# Patient Record
Sex: FEMALE | Race: BLACK OR AFRICAN AMERICAN | Employment: STUDENT | URBAN - METROPOLITAN AREA
[De-identification: names, ages, dates, MRNs, and addresses within clinical notes are randomized per-mention and may not be internally consistent; named-entity substitution may affect disease eponyms.]

---

## 2021-04-13 LAB — DIABETIC RETINOPATHY: POSITIVE

## 2021-05-05 LAB — DIABETIC RETINOPATHY: POSITIVE

## 2021-06-02 DIAGNOSIS — Z79.4 LONG-TERM INSULIN USE (HCC): ICD-10-CM

## 2021-06-02 DIAGNOSIS — E04.1 THYROID NODULE: ICD-10-CM

## 2021-06-02 PROBLEM — E78.5 HYPERLIPIDEMIA, MILD: Status: ACTIVE | Noted: 2020-01-07

## 2021-06-02 PROBLEM — I10 ESSENTIAL HYPERTENSION: Status: ACTIVE | Noted: 2020-06-02

## 2021-06-02 PROBLEM — E66.9 OBESITY (BMI 30-39.9): Status: ACTIVE | Noted: 2019-07-01

## 2021-06-02 PROBLEM — E10.8: Status: ACTIVE | Noted: 2020-01-07

## 2021-06-02 RX ORDER — CHLORAL HYDRATE 500 MG
CAPSULE ORAL
COMMUNITY
End: 2021-06-03

## 2021-06-02 RX ORDER — ERYTHROMYCIN 5 MG/G
0.5 OINTMENT OPHTHALMIC NIGHTLY
COMMUNITY
Start: 2020-10-06 | End: 2021-06-03

## 2021-06-02 RX ORDER — HYDROCHLOROTHIAZIDE 25 MG/1
25 TABLET ORAL EVERY MORNING
COMMUNITY
Start: 2021-05-27 | End: 2021-06-03

## 2021-06-03 ENCOUNTER — OFFICE VISIT (OUTPATIENT)
Dept: ENDOCRINOLOGY | Age: 56
End: 2021-06-03
Payer: COMMERCIAL

## 2021-06-03 VITALS
BODY MASS INDEX: 40.23 KG/M2 | SYSTOLIC BLOOD PRESSURE: 140 MMHG | OXYGEN SATURATION: 98 % | HEIGHT: 62 IN | DIASTOLIC BLOOD PRESSURE: 68 MMHG | WEIGHT: 218.6 LBS | HEART RATE: 104 BPM

## 2021-06-03 DIAGNOSIS — E10.69 DYSLIPIDEMIA DUE TO TYPE 1 DIABETES MELLITUS (HCC): ICD-10-CM

## 2021-06-03 DIAGNOSIS — Z46.81 INSULIN PUMP TITRATION: ICD-10-CM

## 2021-06-03 DIAGNOSIS — E78.5 DYSLIPIDEMIA DUE TO TYPE 1 DIABETES MELLITUS (HCC): ICD-10-CM

## 2021-06-03 DIAGNOSIS — E55.9 VITAMIN D DEFICIENCY: ICD-10-CM

## 2021-06-03 DIAGNOSIS — I10 ESSENTIAL HYPERTENSION: ICD-10-CM

## 2021-06-03 DIAGNOSIS — E10.3299 BACKGROUND DIABETIC RETINOPATHY WITHOUT MACULAR EDEMA ASSOCIATED WITH TYPE 1 DIABETES MELLITUS (HCC): Primary | ICD-10-CM

## 2021-06-03 DIAGNOSIS — E10.21 TYPE 1 DIABETES MELLITUS WITH DIABETIC NEPHROPATHY (HCC): ICD-10-CM

## 2021-06-03 DIAGNOSIS — E04.2 MULTIPLE THYROID NODULES: ICD-10-CM

## 2021-06-03 DIAGNOSIS — Z96.41 INSULIN PUMP STATUS: ICD-10-CM

## 2021-06-03 PROCEDURE — 99205 OFFICE O/P NEW HI 60 MIN: CPT | Performed by: INTERNAL MEDICINE

## 2021-06-03 RX ORDER — BLOOD-GLUCOSE,RECEIVER,CONT
EACH MISCELLANEOUS
Qty: 1 DEVICE | Refills: 0 | Status: SHIPPED | OUTPATIENT
Start: 2021-06-03 | End: 2021-07-06

## 2021-06-03 RX ORDER — LOSARTAN POTASSIUM AND HYDROCHLOROTHIAZIDE 12.5; 5 MG/1; MG/1
1 TABLET ORAL DAILY
Qty: 90 TABLET | Refills: 1 | Status: SHIPPED | OUTPATIENT
Start: 2021-06-03 | End: 2021-11-29

## 2021-06-03 RX ORDER — BLOOD-GLUCOSE TRANSMITTER
EACH MISCELLANEOUS
Qty: 1 EACH | Refills: 3 | Status: SHIPPED | OUTPATIENT
Start: 2021-06-03 | End: 2021-07-06

## 2021-06-03 RX ORDER — BLOOD-GLUCOSE SENSOR
EACH MISCELLANEOUS
Qty: 3 EACH | Refills: 5 | Status: SHIPPED | OUTPATIENT
Start: 2021-06-03 | End: 2021-07-06

## 2021-06-03 NOTE — PROGRESS NOTES
Patient ID:   Christal Sagastume is a 50 y.o. female    Chief Complaint:   Christal Sagastume presents for an initial evaluation of Type 1 Diabetes Mellitus , Hyperlipidemia and hypertension. Referred by Dr. Mary Sims MD.     Subjective:   Type 1 Diabetes Mellitus diagnosed in 8. On insulin since diagnosis. Previously seen by Dr Miles Chauhan. She was not happy with the care . Used Humalog for Omnipod     On asking if she counts carbs. She said she tries some times. She said she eats same stuff     Using meter to check sugars     Basal rates (units per hour)   MN: 1.35   4am: 1.15  6:30am: 1.45  10am: 1.35     Insulin to carb ratio   MN: 4  6pm: 5    Correction   MN: 50    Target glucose range:   Active insulin time : 4 hours     Max bolus: 20 units         Checks blood sugars 2-4 times per day. Reviewed/Reported  AM: 123-402  Lunch: 108-301  Supper:  55, 135-250  HS: 186-419    Hypoglycemias: 55 last week before dinner. She does not remember why it was low. Meals: Three, dinner is bigger. Usually no snacks. No sodas. Exercise: Started dance/stretch and strengthening class yesterday. It is twice a week. She will also start doing Howard-chi    Denies chest pain, exertional dyspnea. Family history of CAD: Dad had MI in his 63's   Denies smoking. Counselled for smoking cessation. Denies/Drinks alcohol. Currently on ASA mg daily     Thyroid nodules:   US May 2020   One on right and and one on left   One subcentimeter thyroid nodule in the right thyroid lobe which is smoothly marginated, not taller-than-wide, and with no echogenic foci, deemed to be clinically insignificant, and for which no biopsy or follow up is recommended per ACR TIRADS criteria.     Other potentially more significant thyroid nodule is are detailed below. No new or enlarging thyroid nodule is identified.      Thyroid Nodule Location: Upper left thyroid lobe   Thyroid Nodule Size:  1 cm   Composition: Solid or almost completely solid Frequency of Social Gatherings with Friends and Family:     Attends Oriental orthodox Services:     Active Member of Clubs or Organizations:     Attends Club or Organization Meetings:     Marital Status:    Intimate Partner Violence:     Fear of Current or Ex-Partner:     Emotionally Abused:     Physically Abused:     Sexually Abused:        Past Medical History:   Diagnosis Date    Diabetes mellitus ([facility] [address]     Edema     Hyperlipidemia     Insulin pump in place     Long-term insulin use ([address]     Low back pain     Thyroid nodule     Type 1 diabetes mellitus with manifestations ([facility] [address]        Past Surgical History:   Procedure Laterality Date    CARPAL TUNNEL RELEASE       SECTION      X3     SECTION      HAND SURGERY      CARPAL X 4 ON RIGHT AND CARPAL X3 ON LEFT         Allergies   Allergen Reactions    Ace Inhibitors Swelling    Other Anaphylaxis    Penicillins      THROAT CLOSES    Atorvastatin      Other reaction(s): Muscle Aches         Current Outpatient Medications:     Continuous Blood Gluc  (DEXCOM G6 ) SO, Use for personal CGM, Disp: 1 Device, Rfl: 0    Continuous Blood Gluc Sensor (DEXCOM G6 SENSOR) MISC, Change every 10 days. Use for personal CGMS. On insulin pump for diabetes. , Disp: 3 each, Rfl: 5    Continuous Blood Gluc Transmit (DEXCOM G6 TRANSMITTER) MISC, Change every 3 months, Disp: 1 each, Rfl: 3    losartan-hydroCHLOROthiazide (HYZAAR) 50-12.5 MG per tablet, Take 1 tablet by mouth daily, Disp: 90 tablet, Rfl: 1    insulin lispro (HUMALOG) 100 UNIT/ML injection, Inject  into the skin 3 times daily (before meals). INSULIN PUMP , Disp: , Rfl:       Review of Systems:    Constitutional: Negative for fever, chills, and unexpected weight change. HENT: Negative for congestion, ear pain, rhinorrhea,  sore throat and trouble swallowing. Eyes: Negative for photophobia, redness, itching.    Respiratory: Negative for cough, shortness of breath and this visit:    Background diabetic retinopathy without macular edema associated with type 1 diabetes mellitus ([address]  -     Continuous Blood Gluc  (Katt Smith) SO; Use for personal CGM  -     Continuous Blood Gluc Sensor (DEXCOM G6 SENSOR) MISC; Change every 10 days. Use for personal CGMS. On insulin pump for diabetes. -     Continuous Blood Gluc Transmit (DEXCOM G6 TRANSMITTER) MISC; Change every 3 months  -     losartan-hydroCHLOROthiazide (HYZAAR) 50-12.5 MG per tablet; Take 1 tablet by mouth daily  -     Vitamin D 25 Hydroxy; Future  -     Comprehensive Metabolic Panel; Future  -     Hemoglobin A1C; Future  -     Lipid, Fasting; Future  -     Microalbumin / Creatinine Urine Ratio; Future  -     US THYROID; Future    Type 1 diabetes mellitus with diabetic nephropathy (HCC)  -     Continuous Blood Gluc  (DEXCOM G6 ) SO; Use for personal CGM  -     Continuous Blood Gluc Sensor (DEXCOM G6 SENSOR) MISC; Change every 10 days. Use for personal CGMS. On insulin pump for diabetes. -     Continuous Blood Gluc Transmit (DEXCOM G6 TRANSMITTER) MISC; Change every 3 months  -     losartan-hydroCHLOROthiazide (HYZAAR) 50-12.5 MG per tablet; Take 1 tablet by mouth daily  -     Vitamin D 25 Hydroxy; Future  -     Comprehensive Metabolic Panel; Future  -     Hemoglobin A1C; Future  -     Lipid, Fasting; Future  -     Microalbumin / Creatinine Urine Ratio; Future  -     US THYROID; Future    Essential hypertension  -     Continuous Blood Gluc  (DEXCOM G6 ) SO; Use for personal CGM  -     Continuous Blood Gluc Sensor (DEXCOM G6 SENSOR) MISC; Change every 10 days. Use for personal CGMS. On insulin pump for diabetes. -     Continuous Blood Gluc Transmit (DEXCOM G6 TRANSMITTER) MISC; Change every 3 months  -     losartan-hydroCHLOROthiazide (HYZAAR) 50-12.5 MG per tablet; Take 1 tablet by mouth daily  -     Vitamin D 25 Hydroxy;  Future  -     Comprehensive Metabolic Panel; Future  -     Hemoglobin A1C; Future  -     Lipid, Fasting; Future  -     Microalbumin / Creatinine Urine Ratio; Future  -     US THYROID; Future    Dyslipidemia due to type 1 diabetes mellitus (HCC)  -     Continuous Blood Gluc  (DEXCOM G6 ) SO; Use for personal CGM  -     Continuous Blood Gluc Sensor (DEXCOM G6 SENSOR) MISC; Change every 10 days. Use for personal CGMS. On insulin pump for diabetes. -     Continuous Blood Gluc Transmit (DEXCOM G6 TRANSMITTER) MISC; Change every 3 months  -     losartan-hydroCHLOROthiazide (HYZAAR) 50-12.5 MG per tablet; Take 1 tablet by mouth daily  -     Vitamin D 25 Hydroxy; Future  -     Comprehensive Metabolic Panel; Future  -     Hemoglobin A1C; Future  -     Lipid, Fasting; Future  -     Microalbumin / Creatinine Urine Ratio; Future  -     US THYROID; Future    Vitamin D deficiency  -     Continuous Blood Gluc  ([address]) SO; Use for personal CGM  -     Continuous Blood Gluc Sensor (DEXCOM G6 SENSOR) MISC; Change every 10 days. Use for personal CGMS. On insulin pump for diabetes. -     Continuous Blood Gluc Transmit (DEXCOM G6 TRANSMITTER) MISC; Change every 3 months  -     losartan-hydroCHLOROthiazide (HYZAAR) 50-12.5 MG per tablet; Take 1 tablet by mouth daily  -     Vitamin D 25 Hydroxy; Future  -     Comprehensive Metabolic Panel; Future  -     Hemoglobin A1C; Future  -     Lipid, Fasting; Future  -     Microalbumin / Creatinine Urine Ratio; Future  -     US THYROID; Future    Insulin pump status  -     Continuous Blood Gluc  (DEXCOM G6 ) SO; Use for personal CGM  -     Continuous Blood Gluc Sensor (DEXCOM G6 SENSOR) MISC; Change every 10 days. Use for personal CGMS. On insulin pump for diabetes. -     Continuous Blood Gluc Transmit (DEXCOM G6 TRANSMITTER) MISC; Change every 3 months  -     losartan-hydroCHLOROthiazide (HYZAAR) 50-12.5 MG per tablet;  Take 1 tablet by mouth daily  -     Vitamin D 25 Hydroxy; Future  -     Comprehensive Metabolic Panel; Future  -     Hemoglobin A1C; Future  -     Lipid, Fasting; Future  -     Microalbumin / Creatinine Urine Ratio; Future  -     US THYROID; Future    Insulin pump titration  -     Continuous Blood Gluc  (DEXCOM G6 ) SO; Use for personal CGM  -     Continuous Blood Gluc Sensor (DEXCOM G6 SENSOR) MISC; Change every 10 days. Use for personal CGMS. On insulin pump for diabetes. -     Continuous Blood Gluc Transmit (DEXCOM G6 TRANSMITTER) MISC; Change every 3 months  -     losartan-hydroCHLOROthiazide (HYZAAR) 50-12.5 MG per tablet; Take 1 tablet by mouth daily  -     Vitamin D 25 Hydroxy; Future  -     Comprehensive Metabolic Panel; Future  -     Hemoglobin A1C; Future  -     Lipid, Fasting; Future  -     Microalbumin / Creatinine Urine Ratio; Future  -     US THYROID; Future    Multiple thyroid nodules  -     Continuous Blood Gluc  (DEXCOM G6 ) SO; Use for personal CGM  -     Continuous Blood Gluc Sensor (DEXCOM G6 SENSOR) MISC; Change every 10 days. Use for personal CGMS. On insulin pump for diabetes. -     Continuous Blood Gluc Transmit (DEXCOM G6 TRANSMITTER) MISC; Change every 3 months  -     losartan-hydroCHLOROthiazide (HYZAAR) 50-12.5 MG per tablet; Take 1 tablet by mouth daily  -     Vitamin D 25 Hydroxy; Future  -     Comprehensive Metabolic Panel; Future  -     Hemoglobin A1C; Future  -     Lipid, Fasting; Future  -     Microalbumin / Creatinine Urine Ratio; Future  -     US THYROID; Future          1: Type 1/2 DM complicated wit nephropathy, retinopathy    Controlled A1C 8.6% Jan 2021   A1C of <8 would be acceptable because of microvascular and macrovascular complications and recurrent hypoglycemias/ or history of severe hypoglycemias or seizure disorder.      She thinks she eats 30-40 grams of carbs but puts 50 grams in the pump to get more insulin     Pump review show boluses are 58% or more     Basal rates (units per hour) MN: 1.35   4am: 1.15 > 1.25   6:30am: 1.45 > 1.60  10am: 1.35     Insulin to carb ratio   MN: 4  6pm: 5    Correction   MN: 50 > 40     Target glucose range:  >    Active insulin time : 4 hours     Max bolus: 20 units     I may consider changing Humalog to U200     Suggest DEXCOM, sending Rx     She does not want to take Victoza for weight loss     All instructions provided in written. Check Blood sugars 4 times per day. Log them along with insulin and send them every 2 weeks. Call for blood sugars less than 60 or more than 400. Eye exam: Last exam in April 2021, reports stable background retinopathy . Foot exam:  June 2021   Deformity/amputation: absent  Skin lesions/pre-ulcerative calluses: absent  Edema: right- negative, left- negative  Sensory exam: Monofilament sensation: normal  Pulses: normal, Vibration (128 Hz): Intact    Renal screen: 229 - Sep 2020  TSH screen: normal Sep 2020     2: HTN   Very high   ACE inhibitor caused swelling   Change HCTZ 25 to Losartan-HCTZ 50-12.5 mg daily in am     3: Hyperlipidemia   LDL: 161 , HDL: 56 , TGs: 130 - Jan 2021    Lipitor caused severe body aches   Check Vit D first and replace   She takes MVT and D3 2,000 units daily     Will start low dose crestor once a day     4: Multiple thyroid nodules  US now     Labs today     RTC in 4 weeks   NOV: 40 minutes     The total time spent for this encounter was 65  minutes. 10 minutes were spent in reviewing the chart/ results/imaging prior to the visit on the day of the visit,  55  minutes were spent with the patient and discussed treatment goals, ordering of labs, further plan in treatment for diabetes, risk of hypoglycemia and hyperglycemia, HTN, insulin pump teaching.      EDUCATION:   Greater than 50% of this visit was spent in general counseling regarding obesity, diet, exercise, importance of adherence to insulin regime, recognition and treatment of hypo and hyperglycemia,  glucose logging, proper diabetes management, diabetic complications with poor management and the importance of glycemic control in order to avoid the complications of diabetes. Risks and potential complications of diabetes were reviewed with the patient. Diabetes health maintenance plan and follow-up were discussed and understood by the patient. We reviewed the importance of medication compliance and regular follow-up. Aggressive lifestyle modification was encouraged. Exercise Counselling: This patient is a candidate for regular physical exercise. Instructions to perform the following types of exercise:  Swimming or water aerobic exercise  Brisk walking  Playing tennis  Stationary bicycle or elliptical indoor  Low impact aerobic exercise    Instructions given to exercise for the following duration:  30 minutes a day for five-seven days per week.     Following instructions for being active throughout the day in addition to formal exercise:  Walk instead of drive whenever possible  Take the stairs instead of the elevator  Work in the garden  Park to the far end of the parking lot to add more walking steps to destination      Electronically signed by Cynthia Evans MD on 6/3/2021 at 4:36 PM

## 2021-06-09 ENCOUNTER — HOSPITAL ENCOUNTER (OUTPATIENT)
Dept: ULTRASOUND IMAGING | Age: 56
Discharge: HOME | End: 2021-06-09
Payer: COMMERCIAL

## 2021-06-09 ENCOUNTER — HOSPITAL ENCOUNTER (OUTPATIENT)
Age: 56
Discharge: HOME | End: 2021-06-09
Payer: COMMERCIAL

## 2021-06-09 DIAGNOSIS — E10.69 DYSLIPIDEMIA DUE TO TYPE 1 DIABETES MELLITUS (HCC): ICD-10-CM

## 2021-06-09 DIAGNOSIS — E04.2 MULTIPLE THYROID NODULES: ICD-10-CM

## 2021-06-09 DIAGNOSIS — Z46.81 INSULIN PUMP TITRATION: ICD-10-CM

## 2021-06-09 DIAGNOSIS — E10.3299 BACKGROUND DIABETIC RETINOPATHY WITHOUT MACULAR EDEMA ASSOCIATED WITH TYPE 1 DIABETES MELLITUS (HCC): ICD-10-CM

## 2021-06-09 DIAGNOSIS — E55.9 VITAMIN D DEFICIENCY: ICD-10-CM

## 2021-06-09 DIAGNOSIS — I10 ESSENTIAL HYPERTENSION: ICD-10-CM

## 2021-06-09 DIAGNOSIS — E78.5 DYSLIPIDEMIA DUE TO TYPE 1 DIABETES MELLITUS (HCC): ICD-10-CM

## 2021-06-09 DIAGNOSIS — E10.21 TYPE 1 DIABETES MELLITUS WITH DIABETIC NEPHROPATHY (HCC): ICD-10-CM

## 2021-06-09 DIAGNOSIS — Z96.41 INSULIN PUMP STATUS: ICD-10-CM

## 2021-06-09 LAB
A/G RATIO: 1.3 (ref 1.1–2.2)
ALBUMIN SERPL-MCNC: 4.3 G/DL (ref 3.4–5)
ALP BLD-CCNC: 84 U/L (ref 40–129)
ALT SERPL-CCNC: 18 U/L (ref 10–40)
ANION GAP SERPL CALCULATED.3IONS-SCNC: 10 MMOL/L (ref 3–16)
AST SERPL-CCNC: 24 U/L (ref 15–37)
BILIRUB SERPL-MCNC: 0.3 MG/DL (ref 0–1)
BUN BLDV-MCNC: 13 MG/DL (ref 7–20)
CALCIUM SERPL-MCNC: 9.4 MG/DL (ref 8.3–10.6)
CHLORIDE BLD-SCNC: 97 MMOL/L (ref 99–110)
CHOLESTEROL, FASTING: 230 MG/DL (ref 0–199)
CO2: 27 MMOL/L (ref 21–32)
CREAT SERPL-MCNC: 0.9 MG/DL (ref 0.6–1.1)
CREATININE URINE: 37.6 MG/DL (ref 28–259)
ESTIMATED AVERAGE GLUCOSE: 203 MG/DL
GFR AFRICAN AMERICAN: >60
GFR NON-AFRICAN AMERICAN: >60
GLOBULIN: 3.2 G/DL
GLUCOSE BLD-MCNC: 192 MG/DL (ref 70–99)
HBA1C MFR BLD: 8.7 %
HDLC SERPL-MCNC: 52 MG/DL (ref 40–60)
LDL CHOLESTEROL CALCULATED: 147 MG/DL
MICROALBUMIN UR-MCNC: 5.2 MG/DL
MICROALBUMIN/CREAT UR-RTO: 138.3 MG/G (ref 0–30)
POTASSIUM SERPL-SCNC: 4.2 MMOL/L (ref 3.5–5.1)
SODIUM BLD-SCNC: 134 MMOL/L (ref 136–145)
TOTAL PROTEIN: 7.5 G/DL (ref 6.4–8.2)
TRIGLYCERIDE, FASTING: 155 MG/DL (ref 0–150)
VITAMIN D 25-HYDROXY: 22.4 NG/ML
VLDLC SERPL CALC-MCNC: 31 MG/DL

## 2021-06-09 PROCEDURE — 82570 ASSAY OF URINE CREATININE: CPT

## 2021-06-09 PROCEDURE — 80061 LIPID PANEL: CPT

## 2021-06-09 PROCEDURE — 76536 US EXAM OF HEAD AND NECK: CPT

## 2021-06-09 PROCEDURE — 80053 COMPREHEN METABOLIC PANEL: CPT

## 2021-06-09 PROCEDURE — 82306 VITAMIN D 25 HYDROXY: CPT

## 2021-06-09 PROCEDURE — 83036 HEMOGLOBIN GLYCOSYLATED A1C: CPT

## 2021-06-09 PROCEDURE — 82043 UR ALBUMIN QUANTITATIVE: CPT

## 2021-06-14 DIAGNOSIS — E55.9 VITAMIN D DEFICIENCY: Primary | ICD-10-CM

## 2021-06-14 RX ORDER — ERGOCALCIFEROL (VITAMIN D2) 1250 MCG
50000 CAPSULE ORAL WEEKLY
Qty: 12 CAPSULE | Refills: 1 | Status: SHIPPED | OUTPATIENT
Start: 2021-06-14 | End: 2021-12-09 | Stop reason: SDUPTHER

## 2021-07-06 ENCOUNTER — OFFICE VISIT (OUTPATIENT)
Dept: ENDOCRINOLOGY | Age: 56
End: 2021-07-06

## 2021-07-06 VITALS
HEART RATE: 92 BPM | BODY MASS INDEX: 40.85 KG/M2 | OXYGEN SATURATION: 99 % | HEIGHT: 62 IN | DIASTOLIC BLOOD PRESSURE: 77 MMHG | WEIGHT: 222 LBS | SYSTOLIC BLOOD PRESSURE: 130 MMHG

## 2021-07-06 DIAGNOSIS — I10 ESSENTIAL HYPERTENSION: ICD-10-CM

## 2021-07-06 DIAGNOSIS — E04.2 MULTIPLE THYROID NODULES: ICD-10-CM

## 2021-07-06 DIAGNOSIS — Z46.81 INSULIN PUMP TITRATION: ICD-10-CM

## 2021-07-06 DIAGNOSIS — E55.9 VITAMIN D DEFICIENCY: Primary | ICD-10-CM

## 2021-07-06 DIAGNOSIS — E10.3299 BACKGROUND DIABETIC RETINOPATHY WITHOUT MACULAR EDEMA ASSOCIATED WITH TYPE 1 DIABETES MELLITUS (HCC): ICD-10-CM

## 2021-07-06 DIAGNOSIS — Z96.41 INSULIN PUMP STATUS: ICD-10-CM

## 2021-07-06 DIAGNOSIS — E78.5 DYSLIPIDEMIA DUE TO TYPE 1 DIABETES MELLITUS (HCC): ICD-10-CM

## 2021-07-06 DIAGNOSIS — E10.69 DYSLIPIDEMIA DUE TO TYPE 1 DIABETES MELLITUS (HCC): ICD-10-CM

## 2021-07-06 DIAGNOSIS — E10.21 TYPE 1 DIABETES MELLITUS WITH DIABETIC NEPHROPATHY (HCC): ICD-10-CM

## 2021-07-06 PROCEDURE — 3052F HG A1C>EQUAL 8.0%<EQUAL 9.0%: CPT | Performed by: INTERNAL MEDICINE

## 2021-07-06 PROCEDURE — 99214 OFFICE O/P EST MOD 30 MIN: CPT | Performed by: INTERNAL MEDICINE

## 2021-07-06 NOTE — PROGRESS NOTES
Patient ID:   Zaria Luo is a 50 y.o. female    Chief Complaint:   Zaria Luo presents for an evaluation of Type 1 Diabetes Mellitus , Hyperlipidemia and hypertension. Referred by Dr. Mary Jo Hawkins MD.     Subjective:   Type 1 Diabetes Mellitus diagnosed in 8. On insulin since diagnosis. Previously seen by Dr Miles Gordon. She was not happy with the care . Used Humalog for Omnipod     On asking if she counts carbs. She said she tries some times. She said she eats same stuff     Using meter to check sugars     Basal rates (units per hour)   MN: 1.35   4am: 1.25   6:30am:  1.60  10am: 1.35     Insulin to carb ratio   MN: 4  6pm: 5    Correction   MN:  40     Max bolus: 20 units         Checks blood sugars 2-4 times per day. Reviewed/Reported  AM:    Lunch:    Supper:     HS:      Hypoglycemias: 55 last week before dinner. She does not remember why it was low. Meals: Three, dinner is bigger. Usually no snacks. No sodas. Exercise: Started dance/stretch and strengthening class yesterday. It is twice a week. She will also start doing Howard-chi    Denies chest pain, exertional dyspnea. Family history of CAD: Dad had MI in his 63's   Denies smoking. Counselled for smoking cessation. Denies/Drinks alcohol. Currently on ASA mg daily     Thyroid nodules:   US May 2020   One on right and and one on left   One subcentimeter thyroid nodule in the right thyroid lobe which is smoothly marginated, not taller-than-wide, and with no echogenic foci, deemed to be clinically insignificant, and for which no biopsy or follow up is recommended per ACR TIRADS criteria.     Other potentially more significant thyroid nodule is are detailed below. No new or enlarging thyroid nodule is identified. Thyroid Nodule Location: Upper left thyroid lobe   Thyroid Nodule Size:  1 cm   Composition: Solid or almost completely solid (2 points)   Echogenicity: Hypoechoic (2 points)   Shape:  Wider than tall (0 points)   Margin: Smooth (0 points)   Echogenic foci: None (0 points)   TIRADS Level: TI-RADS 4   Comparison:  No significant change since the comparison thyroid ultrasound scan   Prior Biopsy:  None   Recommendation for this nodule: US followup at 1, 2, 3, and 5 years        The following portions of the patient's history were reviewed and updated as appropriate:       Family History   Problem Relation Age of Onset    Cancer Mother     High Blood Pressure Mother     Thyroid Disease Mother     Diabetes Father     Heart Disease Father     High Blood Pressure Father     Heart Disease Maternal Grandmother     High Blood Pressure Maternal Grandfather     Stroke Paternal Grandmother     Glaucoma Paternal Grandmother     Diabetes Paternal Aunt          Social History     Socioeconomic History    Marital status:      Spouse name: Not on file    Number of children: Not on file    Years of education: Not on file    Highest education level: Not on file   Occupational History    Not on file   Tobacco Use    Smoking status: Never Smoker    Smokeless tobacco: Never Used   Vaping Use    Vaping Use: Never used   Substance and Sexual Activity    Alcohol use: No    Drug use: No    Sexual activity: Not on file   Other Topics Concern    Not on file   Social History Narrative    Not on file     Social Determinants of Health     Financial Resource Strain:     Difficulty of Paying Living Expenses:    Food Insecurity:     Worried About Running Out of Food in the Last Year:     Ran Out of Food in the Last Year:    Transportation Needs:     Lack of Transportation (Medical):      Lack of Transportation (Non-Medical):    Physical Activity:     Days of Exercise per Week:     Minutes of Exercise per Session:    Stress:     Feeling of Stress :    Social Connections:     Frequency of Communication with Friends and Family:     Frequency of Social Gatherings with Friends and Family:     Attends Mu-ism Services:     Active Member of Clubs or Organizations:     Attends Club or Organization Meetings:     Marital Status:    Intimate Partner Violence:     Fear of Current or Ex-Partner:     Emotionally Abused:     Physically Abused:     Sexually Abused:        Past Medical History:   Diagnosis Date    Diabetes mellitus ([facility] [address]     Edema     Hyperlipidemia     Insulin pump in place     Long-term insulin use ([facility] [address]     Low back pain     Thyroid nodule     Type 1 diabetes mellitus with manifestations ([facility] [address]        Past Surgical History:   Procedure Laterality Date    CARPAL TUNNEL RELEASE       SECTION      X3     SECTION      HAND SURGERY      CARPAL X 4 ON RIGHT AND CARPAL X3 ON LEFT         Allergies   Allergen Reactions    Ace Inhibitors Swelling    Other Anaphylaxis    Penicillins      THROAT CLOSES    Atorvastatin      Other reaction(s): Muscle Aches         Current Outpatient Medications:     ergocalciferol (DRISDOL) 1.25 MG (70470 UT) capsule, Take 1 capsule by mouth once a week, Disp: 12 capsule, Rfl: 1    losartan-hydroCHLOROthiazide (HYZAAR) 50-12.5 MG per tablet, Take 1 tablet by mouth daily, Disp: 90 tablet, Rfl: 1    insulin lispro (HUMALOG) 100 UNIT/ML injection, Inject  into the skin 3 times daily (before meals). INSULIN PUMP , Disp: , Rfl:       Review of Systems:    Constitutional: Negative for fever, chills, and unexpected weight change. HENT: Negative for congestion, ear pain, rhinorrhea,  sore throat and trouble swallowing. Eyes: Negative for photophobia, redness, itching. Respiratory: Negative for cough, shortness of breath and sputum. Cardiovascular: Negative for chest pain, palpitations and leg swelling. Gastrointestinal: Negative for nausea, vomiting, abdominal pain, diarrhea, constipation. Endocrine: Negative for cold intolerance, heat intolerance, polydipsia, polyphagia and polyuria. Genitourinary: Negative for dysuria, urgency, frequency, hematuria and flank pain. Musculoskeletal: Negative for myalgias, back pain, arthralgias and neck pain. Skin/Nail: Negative for rash, itching. Normal nails. Neurological: Negative for seizures, weakness, light-headedness, numbness and headaches. Hematological/ Lymph nodes: Negative for adenopathy. Does not bruise/bleed easily. Psychiatric/Behavioral: Negative for suicidal ideas, depression, anxiety, sleep disturbance and decreased concentration. Objective:   Physical Exam:  BP (!) 145/81 (Site: Left Upper Arm, Position: Sitting, Cuff Size: Large Adult)   Pulse 92   Ht 5' 2\" (1.575 m)   Wt 222 lb (100.7 kg)   LMP 03/06/2021   SpO2 99%   BMI 40.60 kg/m²   Constitutional: Patient is oriented to person, place, and time. Patient appears well-developed and well-nourished. HENT:    Head: Normocephalic and atraumatic. Eyes: Conjunctivae and EOM are normal.     Neck: Normal range of motion. Thyroid normal.   Cardiovascular: Normal rate, regular rhythm and normal heart sounds. Pulmonary/Chest: Effort normal and breath sounds normal.   Abdominal: Soft. Bowel sounds are normal.   Musculoskeletal: Normal range of motion. Neurological: Patient is alert and oriented to person, place, and time. Patient has normal reflexes. Skin: Skin is warm and dry. Psychiatric: Patient has a normal mood and affect.  Patient behavior is normal.     Lab Review:    Hospital Outpatient Visit on 06/09/2021   Component Date Value Ref Range Status    Cholesterol, Fasting 06/09/2021 230* 0 - 199 mg/dL Final    Triglyceride, Fasting 06/09/2021 155* 0 - 150 mg/dL Final    HDL 06/09/2021 52  40 - 60 mg/dL Final    LDL Calculated 06/09/2021 147* <100 mg/dL Final    VLDL Cholesterol Calculated 06/09/2021 31  Not Established mg/dL Final    Hemoglobin A1C 06/09/2021 8.7  See comment % Final    eAG 06/09/2021 203.0  mg/dL Final    Sodium 06/09/2021 134* 136 - 145 mmol/L Final    Potassium 06/09/2021 4.2  3.5 - 5.1 mmol/L Final    Chloride 06/09/2021 97* 99 - 110 mmol/L Final    CO2 06/09/2021 27  21 - 32 mmol/L Final    Anion Gap 06/09/2021 10  3 - 16 Final    Glucose 06/09/2021 192* 70 - 99 mg/dL Final    BUN 06/09/2021 13  7 - 20 mg/dL Final    CREATININE 06/09/2021 0.9  0.6 - 1.1 mg/dL Final    GFR Non- 06/09/2021 >60  >60 Final    GFR  06/09/2021 >60  >60 Final    Calcium 06/09/2021 9.4  8.3 - 10.6 mg/dL Final    Total Protein 06/09/2021 7.5  6.4 - 8.2 g/dL Final    Albumin 06/09/2021 4.3  3.4 - 5.0 g/dL Final    Albumin/Globulin Ratio 06/09/2021 1.3  1.1 - 2.2 Final    Total Bilirubin 06/09/2021 0.3  0.0 - 1.0 mg/dL Final    Alkaline Phosphatase 06/09/2021 84  40 - 129 U/L Final    ALT 06/09/2021 18  10 - 40 U/L Final    AST 06/09/2021 24  15 - 37 U/L Final    Globulin 06/09/2021 3.2  g/dL Final    Vit D, 25-Hydroxy 06/09/2021 22.4* >=30 ng/mL Final    Microalbumin, Random Urine 06/09/2021 5.20* <2.0 mg/dL Final    Creatinine, Ur 06/09/2021 37.6  28.0 - 259.0 mg/dL Final    Microalbumin Creatinine Ratio 06/09/2021 138.3* 0.0 - 30.0 mg/g Final   Office Visit on 06/03/2021   Component Date Value Ref Range Status    Diabetic Retinopathy 04/13/2021 Positive   Final           Assessment and Plan     Ileana was seen today for follow-up, diabetes and thyroid problem. Diagnoses and all orders for this visit:    Vitamin D deficiency  -     Vitamin D 25 Hydroxy;  Future    Background diabetic retinopathy without macular edema associated with type 1 diabetes mellitus (HCC)  -     Hemoglobin A1C; Future    Type 1 diabetes mellitus with diabetic nephropathy (HCC)  -     Hemoglobin A1C; Future    Essential hypertension    Dyslipidemia due to type 1 diabetes mellitus (HCC)    Insulin pump status    Insulin pump titration    Multiple thyroid nodules          1: Type 1 DM complicated wit nephropathy, retinopathy    Controlled A1C 8.7% <  8.6%     A1C of <8 would be acceptable because of microvascular complications. Ave glucose : 173   Pump review show boluses are 52% or more   Blood sugars are higher in am, better during the day     To lose weight, target [place]an intake less than 1050 a day     Basal rates (units per hour)   MN: 1.35   4am: 1.25   6:30am:  1.60  10pm: 1.35     Insulin to carb ratio   MN: 4  6pm: 5    Correction   MN:  40     Target glucose range:  >    Active insulin time : 4 hours     Max bolus: 20 units     I may consider changing Humalog to [address], not covered. May have to do PA. She does not want to take Victoza for weight loss     All instructions provided in written. Check Blood sugars 4 times per day. Log them along with insulin and send them every 2 weeks. Call for blood sugars less than 60 or more than 400. Eye exam: Last exam in April 2021, reports stable background retinopathy . Foot exam:  June 2021   Deformity/amputation: absent  Skin lesions/pre-ulcerative calluses: absent  Edema: right- negative, left- negative  Sensory exam: Monofilament sensation: normal  Pulses: normal, Vibration (128 Hz): Intact    Renal screen: 229 - Sep 2020, 138 June 2021   TSH screen: normal Sep 2020     2: HTN   Very high   ACE inhibitor caused swelling   On first visit: HCTZ 25 to Losartan-HCTZ 50-12.5 mg daily in am     3: Hyperlipidemia   LDL: 147 , HDL: 52 , TGs: 155 - June 2021     Lipitor caused severe body aches   Check Vit D first and replace     Vit D :22 - June 2021   On ergocalciferol 50K units weekly     She takes MVT and D3 2,000 units daily     Will start low dose crestor once a week when Vit D is better     4: Multiple thyroid nodules  US June 2021:   Showed multiple thyroid nodules. One was 9mm, TR4.      Repeat US in June-July 2022      RTC in 2 months with A1C, Vit D      EDUCATION:   Greater than 50% of this visit was spent in general counseling regarding obesity, diet, exercise, importance of adherence to insulin regime, recognition and treatment

## 2021-09-07 ENCOUNTER — OFFICE VISIT (OUTPATIENT)
Dept: ENDOCRINOLOGY | Age: 56
End: 2021-09-07
Payer: COMMERCIAL

## 2021-09-07 VITALS
WEIGHT: 218 LBS | HEART RATE: 74 BPM | DIASTOLIC BLOOD PRESSURE: 86 MMHG | SYSTOLIC BLOOD PRESSURE: 136 MMHG | BODY MASS INDEX: 39.87 KG/M2 | OXYGEN SATURATION: 99 %

## 2021-09-07 DIAGNOSIS — E10.3299 BACKGROUND DIABETIC RETINOPATHY WITHOUT MACULAR EDEMA ASSOCIATED WITH TYPE 1 DIABETES MELLITUS (HCC): ICD-10-CM

## 2021-09-07 DIAGNOSIS — E10.69 DYSLIPIDEMIA DUE TO TYPE 1 DIABETES MELLITUS (HCC): ICD-10-CM

## 2021-09-07 DIAGNOSIS — E55.9 VITAMIN D DEFICIENCY: Primary | ICD-10-CM

## 2021-09-07 DIAGNOSIS — E10.21 TYPE 1 DIABETES MELLITUS WITH DIABETIC NEPHROPATHY (HCC): ICD-10-CM

## 2021-09-07 DIAGNOSIS — I10 ESSENTIAL HYPERTENSION: ICD-10-CM

## 2021-09-07 DIAGNOSIS — E78.5 DYSLIPIDEMIA DUE TO TYPE 1 DIABETES MELLITUS (HCC): ICD-10-CM

## 2021-09-07 PROCEDURE — 99214 OFFICE O/P EST MOD 30 MIN: CPT | Performed by: INTERNAL MEDICINE

## 2021-09-07 PROCEDURE — 3051F HG A1C>EQUAL 7.0%<8.0%: CPT | Performed by: INTERNAL MEDICINE

## 2021-09-07 NOTE — PROGRESS NOTES
Patient ID:   Beata Becerra is a 50 y.o. female    Chief Complaint:   Beata Becerra presents for an evaluation of Type 1 Diabetes Mellitus , Hyperlipidemia and hypertension. Referred by Dr. Anastasia Donahue MD.     Subjective:   Type 1 Diabetes Mellitus diagnosed in 8. On insulin since diagnosis. Previously seen by Dr Miles Ramsey. She was not happy with the care . Used Humalog for Omnipod     On asking if she counts carbs. She said she tries some times. She said she eats same stuff     Using meter to check sugars     Basal rates (units per hour)   MN: 1.35   4am: 1.25   6:30am:  1.60  10am: 1.35     Insulin to carb ratio   MN: 4  6pm: 5    Correction   MN:  40     Max bolus: 20 units     Target glucose range:    Active insulin time : 4 hours     Checks blood sugars 2-4 times per day. Reviewed/Reported  AM:    Lunch:    Supper:     HS:      Hypoglycemias: 55 last week before dinner. She does not remember why it was low. Meals: Three, dinner is bigger. Usually no snacks. No sodas. Exercise: Started dance/stretch and strengthening class yesterday. It is twice a week. She will also start doing Howard-chi    Denies chest pain, exertional dyspnea. Family history of CAD: Dad had MI in his 63's   Denies smoking. Counselled for smoking cessation. Denies/Drinks alcohol. Currently on ASA mg daily     Thyroid nodules:   US May 2020   One on right and and one on left   One subcentimeter thyroid nodule in the right thyroid lobe which is smoothly marginated, not taller-than-wide, and with no echogenic foci, deemed to be clinically insignificant, and for which no biopsy or follow up is recommended per ACR TIRADS criteria.     Other potentially more significant thyroid nodule is are detailed below. No new or enlarging thyroid nodule is identified.      Thyroid Nodule Location: Upper left thyroid lobe   Thyroid Nodule Size:  1 cm   Composition: Solid or almost completely solid (2 points)   Echogenicity: Hypoechoic (2 points)   Shape: Wider than tall (0 points)   Margin: Smooth (0 points)   Echogenic foci: None (0 points)   TIRADS Level: TI-RADS 4   Comparison:  No significant change since the comparison thyroid ultrasound scan   Prior Biopsy:  None   Recommendation for this nodule: US followup at 1, 2, 3, and 5 years        The following portions of the patient's history were reviewed and updated as appropriate:       Family History   Problem Relation Age of Onset    Cancer Mother     High Blood Pressure Mother     Thyroid Disease Mother     Diabetes Father     Heart Disease Father     High Blood Pressure Father     Heart Disease Maternal Grandmother     High Blood Pressure Maternal Grandfather     Stroke Paternal Grandmother     Glaucoma Paternal Grandmother     Diabetes Paternal Aunt          Social History     Socioeconomic History    Marital status:      Spouse name: Not on file    Number of children: Not on file    Years of education: Not on file    Highest education level: Not on file   Occupational History    Not on file   Tobacco Use    Smoking status: Never Smoker    Smokeless tobacco: Never Used   Vaping Use    Vaping Use: Never used   Substance and Sexual Activity    Alcohol use: No    Drug use: No    Sexual activity: Not on file   Other Topics Concern    Not on file   Social History Narrative    Not on file     Social Determinants of Health     Financial Resource Strain:     Difficulty of Paying Living Expenses:    Food Insecurity:     Worried About Running Out of Food in the Last Year:     Ran Out of Food in the Last Year:    Transportation Needs:     Lack of Transportation (Medical):      Lack of Transportation (Non-Medical):    Physical Activity:     Days of Exercise per Week:     Minutes of Exercise per Session:    Stress:     Feeling of Stress :    Social Connections:     Frequency of Communication with Friends and Family:     Frequency of Social Gatherings with Friends and Family:     Attends Congregational Services:     Active Member of Clubs or Organizations:     Attends Club or Organization Meetings:     Marital Status:    Intimate Partner Violence:     Fear of Current or Ex-Partner:     Emotionally Abused:     Physically Abused:     Sexually Abused:        Past Medical History:   Diagnosis Date    Diabetes mellitus ([facility] [address]     Edema     Hyperlipidemia     Insulin pump in place     Long-term insulin use ([facility] [address]     Low back pain     Thyroid nodule     Type 1 diabetes mellitus with manifestations ([facility] [address]        Past Surgical History:   Procedure Laterality Date    CARPAL TUNNEL RELEASE       SECTION      X3     SECTION      HAND SURGERY      CARPAL X 4 ON RIGHT AND CARPAL X3 ON LEFT         Allergies   Allergen Reactions    Ace Inhibitors Swelling    Other Anaphylaxis    Penicillins      THROAT CLOSES    Atorvastatin      Other reaction(s): Muscle Aches         Current Outpatient Medications:     insulin lispro (HUMALOG KWIKPEN U-200) 200 UNIT/ML SOPN pen, Use up to 80 units per day, Disp: 8 pen, Rfl: 3    insulin lispro (HUMALOG) 100 UNIT/ML injection vial, Through insulin pump, up to 80 units per day, Disp: 8 vial, Rfl: 2    ergocalciferol (DRISDOL) 1.25 MG (35801 UT) capsule, Take 1 capsule by mouth once a week, Disp: 12 capsule, Rfl: 1    losartan-hydroCHLOROthiazide (HYZAAR) 50-12.5 MG per tablet, Take 1 tablet by mouth daily, Disp: 90 tablet, Rfl: 1      Review of Systems:    Constitutional: Negative for fever, chills, and unexpected weight change. HENT: Negative for congestion, ear pain, rhinorrhea,  sore throat and trouble swallowing. Eyes: Negative for photophobia, redness, itching. Respiratory: Negative for cough, shortness of breath and sputum. Cardiovascular: Negative for chest pain, palpitations and leg swelling. Gastrointestinal: Negative for nausea, vomiting, abdominal pain, diarrhea, constipation. Endocrine: Negative for cold intolerance, heat intolerance, polydipsia, polyphagia and polyuria. Genitourinary: Negative for dysuria, urgency, frequency, hematuria and flank pain. Musculoskeletal: Negative for myalgias, back pain, arthralgias and neck pain. Skin/Nail: Negative for rash, itching. Normal nails. Neurological: Negative for seizures, weakness, light-headedness, numbness and headaches. Hematological/ Lymph nodes: Negative for adenopathy. Does not bruise/bleed easily. Psychiatric/Behavioral: Negative for suicidal ideas, depression, anxiety, sleep disturbance and decreased concentration. Objective:   Physical Exam:  /86   Pulse 74   Wt 218 lb (98.9 kg)   SpO2 99%   BMI 39.87 kg/m²   Constitutional: Patient is oriented to person, place, and time. Patient appears well-developed and well-nourished. HENT:    Head: Normocephalic and atraumatic. Eyes: Conjunctivae and EOM are normal.     Neck: Normal range of motion. Thyroid normal.   Cardiovascular: Normal rate, regular rhythm and normal heart sounds. Pulmonary/Chest: Effort normal and breath sounds normal.   Abdominal: Soft. Bowel sounds are normal.   Musculoskeletal: Normal range of motion. Neurological: Patient is alert and oriented to person, place, and time. Patient has normal reflexes. Skin: Skin is warm and dry. Psychiatric: Patient has a normal mood and affect.  Patient behavior is normal.     Lab Review:    Orders Only on 08/31/2021   Component Date Value Ref Range Status    Vit D, 25-Hydroxy 08/31/2021 32.2  >=30 ng/mL Final    Hemoglobin A1C 08/31/2021 7.7  See comment % Final    eAG 08/31/2021 174.3  mg/dL Final   Hospital Outpatient Visit on 06/09/2021   Component Date Value Ref Range Status    Cholesterol, Fasting 06/09/2021 230* 0 - 199 mg/dL Final    Triglyceride, Fasting 06/09/2021 155* 0 - 150 mg/dL Final    HDL 06/09/2021 52  40 - 60 mg/dL Final    LDL Calculated 06/09/2021 147* <100 mg/dL Final    VLDL Cholesterol Calculated 06/09/2021 31  Not Established mg/dL Final    Hemoglobin A1C 06/09/2021 8.7  See comment % Final    eAG 06/09/2021 203.0  mg/dL Final    Sodium 06/09/2021 134* 136 - 145 mmol/L Final    Potassium 06/09/2021 4.2  3.5 - 5.1 mmol/L Final    Chloride 06/09/2021 97* 99 - 110 mmol/L Final    CO2 06/09/2021 27  21 - 32 mmol/L Final    Anion Gap 06/09/2021 10  3 - 16 Final    Glucose 06/09/2021 192* 70 - 99 mg/dL Final    BUN 06/09/2021 13  7 - 20 mg/dL Final    CREATININE 06/09/2021 0.9  0.6 - 1.1 mg/dL Final    GFR Non- 06/09/2021 >60  >60 Final    GFR  06/09/2021 >60  >60 Final    Calcium 06/09/2021 9.4  8.3 - 10.6 mg/dL Final    Total Protein 06/09/2021 7.5  6.4 - 8.2 g/dL Final    Albumin 06/09/2021 4.3  3.4 - 5.0 g/dL Final    Albumin/Globulin Ratio 06/09/2021 1.3  1.1 - 2.2 Final    Total Bilirubin 06/09/2021 0.3  0.0 - 1.0 mg/dL Final    Alkaline Phosphatase 06/09/2021 84  40 - 129 U/L Final    ALT 06/09/2021 18  10 - 40 U/L Final    AST 06/09/2021 24  15 - 37 U/L Final    Globulin 06/09/2021 3.2  g/dL Final    Vit D, 25-Hydroxy 06/09/2021 22.4* >=30 ng/mL Final    Microalbumin, Random Urine 06/09/2021 5.20* <2.0 mg/dL Final    Creatinine, Ur 06/09/2021 37.6  28.0 - 259.0 mg/dL Final    Microalbumin Creatinine Ratio 06/09/2021 138.3* 0.0 - 30.0 mg/g Final   Office Visit on 06/03/2021   Component Date Value Ref Range Status    Diabetic Retinopathy 04/13/2021 Positive   Final           Assessment and Plan     Ileana was seen today for follow-up. Diagnoses and all orders for this visit:    Vitamin D deficiency  -     Vitamin D 25 Hydroxy;  Future    Background diabetic retinopathy without macular edema associated with type 1 diabetes mellitus (HCC)  -     Hemoglobin A1C; Future    Type 1 diabetes mellitus with diabetic nephropathy (HCC)  -     insulin lispro (HUMALOG KWIKPEN U-200) 200 UNIT/ML SOPN pen; Use up to 80 units per day  -     Hemoglobin A1C; Future    Essential hypertension    Dyslipidemia due to type 1 diabetes mellitus ([facility] [address]          1: Type 1 DM complicated with nephropathy, retinopathy    Controlled A1C 7.7% < 8.7% <  8.6%     A1C of <8 would be acceptable because of microvascular complications. Ave glucose : 271 < 173   In target range: 27%, high 73%, low : 0%   Pump review show boluses are 53% or more   Over riding 60.9%  Blood sugars are high in the evening, unable to get more than 20 units   She feels correction is not strong enough either     To lose weight, target Hernan intake less than 1050 a day     Basal rates (units per hour)   MN: 1.35   4am: 1.25   6:30am:  1.60  10pm: 1.35     Insulin to carb ratio   MN: 4  6pm: 5    Correction   MN:  40 > 25     Target glucose range:    Active insulin time : 4 hours     Max bolus: 20 > 25  units     I may consider changing Humalog to U200   Sending in Rx. If insurance approves U200, I will change the settings in the pump     DEXCOM is expensive. She does not want to take Victoza for weight loss     All instructions provided in written. Check Blood sugars 4 times per day. Log them along with insulin and send them every 2 weeks. Call for blood sugars less than 60 or more than 400. Eye exam: Last exam in April 2021, reports stable background retinopathy . Foot exam:  June 2021   Deformity/amputation: absent  Skin lesions/pre-ulcerative calluses: absent  Edema: right- negative, left- negative  Sensory exam: Monofilament sensation: normal  Pulses: normal, Vibration (128 Hz):  Intact    Renal screen: 229 - Sep 2020, 138 June 2021   TSH screen: normal Sep 2020     2: HTN   Controlled   ACE inhibitor caused swelling   Losartan-HCTZ 50-12.5 mg daily in am     3: Hyperlipidemia   LDL: 147 , HDL: 52 , TGs: 155 - June 2021     Lipitor caused severe body aches   Check Vit D first and replace     Vit D : 32 - June 2021   On ergocalciferol 50K units weekly     She takes MVT and D3 2,000 units daily     Will start low dose crestor once a week when Vit D is better (in 40-50)     4: Multiple thyroid nodules  US June 2021:   Showed multiple thyroid nodules. One was 9mm, TR4. Repeat US in June-July 2022     5: Cough   In the morning   Suggest trying OTC PPIor get PPIs from pcp      RTC in 3 months with A1C, Vit D      EDUCATION:   Greater than 50% of this visit was spent in general counseling regarding obesity, diet, exercise, importance of adherence to insulin regime, recognition and treatment of hypo and hyperglycemia,  glucose logging, proper diabetes management, diabetic complications with poor management and the importance of glycemic control in order to avoid the complications of diabetes. Risks and potential complications of diabetes were reviewed with the patient. Diabetes health maintenance plan and follow-up were discussed and understood by the patient. We reviewed the importance of medication compliance and regular follow-up. Aggressive lifestyle modification was encouraged. Exercise Counselling: This patient is a candidate for regular physical exercise. Instructions to perform the following types of exercise:  Swimming or water aerobic exercise  Brisk walking  Playing tennis  Stationary bicycle or elliptical indoor  Low impact aerobic exercise    Instructions given to exercise for the following duration:  30 minutes a day for five-seven days per week.     Following instructions for being active throughout the day in addition to formal exercise:  Walk instead of drive whenever possible  Take the stairs instead of the elevator  Work in the garden  Park to the far end of the parking lot to add more walking steps to destination      Electronically signed by Alena Gitelman, MD on 9/7/2021 at 8:45 AM

## 2021-11-29 ENCOUNTER — PATIENT MESSAGE (OUTPATIENT)
Dept: ENDOCRINOLOGY | Age: 56
End: 2021-11-29

## 2021-11-29 DIAGNOSIS — Z46.81 INSULIN PUMP TITRATION: ICD-10-CM

## 2021-11-29 DIAGNOSIS — E10.69 DYSLIPIDEMIA DUE TO TYPE 1 DIABETES MELLITUS (HCC): ICD-10-CM

## 2021-11-29 DIAGNOSIS — E10.3299 BACKGROUND DIABETIC RETINOPATHY WITHOUT MACULAR EDEMA ASSOCIATED WITH TYPE 1 DIABETES MELLITUS (HCC): ICD-10-CM

## 2021-11-29 DIAGNOSIS — E78.5 DYSLIPIDEMIA DUE TO TYPE 1 DIABETES MELLITUS (HCC): ICD-10-CM

## 2021-11-29 DIAGNOSIS — E04.2 MULTIPLE THYROID NODULES: ICD-10-CM

## 2021-11-29 DIAGNOSIS — E55.9 VITAMIN D DEFICIENCY: ICD-10-CM

## 2021-11-29 DIAGNOSIS — Z96.41 INSULIN PUMP STATUS: ICD-10-CM

## 2021-11-29 DIAGNOSIS — I10 ESSENTIAL HYPERTENSION: ICD-10-CM

## 2021-11-29 DIAGNOSIS — E10.21 TYPE 1 DIABETES MELLITUS WITH DIABETIC NEPHROPATHY (HCC): ICD-10-CM

## 2021-11-29 RX ORDER — LOSARTAN POTASSIUM AND HYDROCHLOROTHIAZIDE 12.5; 5 MG/1; MG/1
1 TABLET ORAL DAILY
Qty: 90 TABLET | Refills: 1 | Status: SHIPPED | OUTPATIENT
Start: 2021-11-29 | End: 2022-06-03

## 2021-11-29 NOTE — TELEPHONE ENCOUNTER
Requested Prescriptions     Pending Prescriptions Disp Refills    losartan-hydroCHLOROthiazide (HYZAAR) 50-12.5 MG per tablet [Pharmacy Med Name: LOSARTAN/HCTZ 50/12.5MG TABLETS] 90 tablet 1     Sig: TAKE 1 TABLET BY MOUTH DAILY     Last refilled:6/3/2021  Last seen: 9/7/2021  Follow up: 12/9/2021

## 2021-11-29 NOTE — TELEPHONE ENCOUNTER
Settings as below on Humalog U-200      Basal rates (units per hour)   MN: 0.65  4am: 0.60   6:30am:  0.80   10pm: 0.65      Insulin to carb ratio   MN:  8  6pm:  10      Correction   MN:  50     Target glucose range:    Active insulin time : 4 hours      Max bolus:  20 units       Blood sugar now 155   spoke to patient about settings

## 2021-12-03 DIAGNOSIS — E10.3299 BACKGROUND DIABETIC RETINOPATHY WITHOUT MACULAR EDEMA ASSOCIATED WITH TYPE 1 DIABETES MELLITUS (HCC): ICD-10-CM

## 2021-12-03 DIAGNOSIS — E10.21 TYPE 1 DIABETES MELLITUS WITH DIABETIC NEPHROPATHY (HCC): ICD-10-CM

## 2021-12-03 DIAGNOSIS — E55.9 VITAMIN D DEFICIENCY: ICD-10-CM

## 2021-12-03 LAB — VITAMIN D 25-HYDROXY: 30.6 NG/ML

## 2021-12-04 LAB
ESTIMATED AVERAGE GLUCOSE: 200.1 MG/DL
HBA1C MFR BLD: 8.6 %

## 2021-12-09 ENCOUNTER — OFFICE VISIT (OUTPATIENT)
Dept: ENDOCRINOLOGY | Age: 56
End: 2021-12-09
Payer: COMMERCIAL

## 2021-12-09 VITALS
WEIGHT: 216 LBS | SYSTOLIC BLOOD PRESSURE: 132 MMHG | OXYGEN SATURATION: 97 % | HEIGHT: 62 IN | HEART RATE: 94 BPM | DIASTOLIC BLOOD PRESSURE: 83 MMHG | BODY MASS INDEX: 39.75 KG/M2

## 2021-12-09 DIAGNOSIS — E10.3299 BACKGROUND DIABETIC RETINOPATHY WITHOUT MACULAR EDEMA ASSOCIATED WITH TYPE 1 DIABETES MELLITUS (HCC): Primary | ICD-10-CM

## 2021-12-09 DIAGNOSIS — E10.21 TYPE 1 DIABETES MELLITUS WITH DIABETIC NEPHROPATHY (HCC): ICD-10-CM

## 2021-12-09 DIAGNOSIS — E55.9 VITAMIN D DEFICIENCY: ICD-10-CM

## 2021-12-09 DIAGNOSIS — E78.5 DYSLIPIDEMIA DUE TO TYPE 1 DIABETES MELLITUS (HCC): ICD-10-CM

## 2021-12-09 DIAGNOSIS — E10.69 DYSLIPIDEMIA DUE TO TYPE 1 DIABETES MELLITUS (HCC): ICD-10-CM

## 2021-12-09 PROCEDURE — 3052F HG A1C>EQUAL 8.0%<EQUAL 9.0%: CPT | Performed by: INTERNAL MEDICINE

## 2021-12-09 PROCEDURE — 99214 OFFICE O/P EST MOD 30 MIN: CPT | Performed by: INTERNAL MEDICINE

## 2021-12-09 RX ORDER — ERGOCALCIFEROL (VITAMIN D2) 1250 MCG
50000 CAPSULE ORAL WEEKLY
Qty: 12 CAPSULE | Refills: 3 | Status: SHIPPED | OUTPATIENT
Start: 2021-12-09 | End: 2022-03-10 | Stop reason: SDUPTHER

## 2021-12-09 RX ORDER — BLOOD-GLUCOSE SENSOR
EACH MISCELLANEOUS
Qty: 3 EACH | Refills: 5 | Status: SHIPPED | OUTPATIENT
Start: 2021-12-09 | End: 2022-09-23

## 2021-12-09 RX ORDER — BLOOD-GLUCOSE TRANSMITTER
EACH MISCELLANEOUS
Qty: 1 EACH | Refills: 3 | Status: SHIPPED | OUTPATIENT
Start: 2021-12-09 | End: 2022-06-16 | Stop reason: SDUPTHER

## 2021-12-09 RX ORDER — BLOOD-GLUCOSE,RECEIVER,CONT
EACH MISCELLANEOUS
Qty: 1 EACH | Refills: 0 | Status: SHIPPED | OUTPATIENT
Start: 2021-12-09 | End: 2022-09-23

## 2021-12-09 RX ORDER — ALBUTEROL SULFATE 90 UG/1
AEROSOL, METERED RESPIRATORY (INHALATION)
COMMUNITY
Start: 2021-09-21 | End: 2022-06-16 | Stop reason: ALTCHOICE

## 2021-12-09 NOTE — PROGRESS NOTES
Patient ID:   Mian Gamez is a 49 y.o. female    Chief Complaint:   Mian Gamez presents for an evaluation of Type 1 Diabetes Mellitus , Hyperlipidemia and hypertension. Referred by Dr. Za Cohen MD.     Subjective:   Type 1 Diabetes Mellitus diagnosed in 8. On insulin since diagnosis. Previously seen by Dr Miles Lora. She was not happy with the care . Used Humalog U200 for Omnipod     On asking if she counts carbs. She said she tries some times. She said she eats same stuff     Using meter to check sugars     Basal rates (units per hour)   MN: 0.70  4am: 0.60   6:30am:  0.80   10pm: 0.65      Insulin to carb ratio   MN:  8  6pm:  10      Correction   MN:  50     Target glucose range:    Active insulin time : 4 hours      Max bolus:  20 units     Checks blood sugars 2-4 times per day. Reviewed/Reported  AM:    Lunch:    Supper:     HS:      Hypoglycemias: 55 last week before dinner. She does not remember why it was low. Meals: Three, dinner is bigger. Usually no snacks. No sodas. Exercise: Started dance/stretch and strengthening class yesterday. It is twice a week. She will also start doing Howard-chi    Denies chest pain, exertional dyspnea. Family history of CAD: Dad had MI in his 63's   Denies smoking. Counselled for smoking cessation. Denies/Drinks alcohol. Currently on ASA mg daily     Thyroid nodules:   US May 2020   One on right and and one on left   One subcentimeter thyroid nodule in the right thyroid lobe which is smoothly marginated, not taller-than-wide, and with no echogenic foci, deemed to be clinically insignificant, and for which no biopsy or follow up is recommended per ACR TIRADS criteria.     Other potentially more significant thyroid nodule is are detailed below. No new or enlarging thyroid nodule is identified.      Thyroid Nodule Location: Upper left thyroid lobe   Thyroid Nodule Size:  1 cm   Composition: Solid or almost completely solid (2 points) Echogenicity: Hypoechoic (2 points)   Shape: Wider than tall (0 points)   Margin: Smooth (0 points)   Echogenic foci: None (0 points)   TIRADS Level: TI-RADS 4   Comparison:  No significant change since the comparison thyroid ultrasound scan   Prior Biopsy:  None   Recommendation for this nodule: US followup at 1, 2, 3, and 5 years        The following portions of the patient's history were reviewed and updated as appropriate:       Family History   Problem Relation Age of Onset    Cancer Mother     High Blood Pressure Mother     Thyroid Disease Mother     Diabetes Father     Heart Disease Father     High Blood Pressure Father     Heart Disease Maternal Grandmother     High Blood Pressure Maternal Grandfather     Stroke Paternal Grandmother     Glaucoma Paternal Grandmother     Diabetes Paternal Aunt          Social History     Socioeconomic History    Marital status:      Spouse name: Not on file    Number of children: Not on file    Years of education: Not on file    Highest education level: Not on file   Occupational History    Not on file   Tobacco Use    Smoking status: Never Smoker    Smokeless tobacco: Never Used   Vaping Use    Vaping Use: Never used   Substance and Sexual Activity    Alcohol use: No    Drug use: No    Sexual activity: Not on file   Other Topics Concern    Not on file   Social History Narrative    Not on file     Social Determinants of Health     Financial Resource Strain:     Difficulty of Paying Living Expenses: Not on file   Food Insecurity:     Worried About Running Out of Food in the Last Year: Not on file    [place] of Food in the Last Year: Not on file   Transportation Needs:     Lack of Transportation (Medical): Not on file    Lack of Transportation (Non-Medical):  Not on file   Physical Activity:     Days of Exercise per Week: Not on file    Minutes of Exercise per Session: Not on file   Stress:     Feeling of Stress : Not on file   Social Connections:     Frequency of Communication with Friends and Family: Not on file    Frequency of Social Gatherings with Friends and Family: Not on file    Attends Nondenominational Services: Not on file    Active Member of Clubs or Organizations: Not on file    Attends Club or Organization Meetings: Not on file    Marital Status: Not on file   Intimate Partner Violence:     Fear of Current or Ex-Partner: Not on file    Emotionally Abused: Not on file    Physically Abused: Not on file    Sexually Abused: Not on file   Housing Stability:     Unable to Pay for Housing in the Last Year: Not on file    Number of [place] in the Last Year: Not on file    Unstable Housing in the Last Year: Not on file       Past Medical History:   Diagnosis Date    Diabetes mellitus ([facility] [address]     Edema     Hyperlipidemia     Insulin pump in place     Long-term insulin use ([facility] [address] [facility]     Low back pain     Thyroid nodule     Type 1 diabetes mellitus with manifestations ([facility] [address]        Past Surgical History:   Procedure Laterality Date    CARPAL TUNNEL RELEASE       SECTION      X3     SECTION      HAND SURGERY      CARPAL X 4 ON RIGHT AND CARPAL X3 ON LEFT         Allergies   Allergen Reactions    Ace Inhibitors Swelling    Other Anaphylaxis    Penicillins      THROAT CLOSES    Atorvastatin      Other reaction(s): Muscle Aches         Current Outpatient Medications:     albuterol sulfate  (90 Base) MCG/ACT inhaler, INHALE 2 PUFFS BY MOUTH EVERY 6 HOURS AS NEEDED, Disp: , Rfl:     Continuous Blood Gluc  (DEXCOM G6 ) SO, Use for personal CGM, Disp: 1 each, Rfl: 0    Continuous Blood Gluc Sensor (DEXCOM G6 SENSOR) MISC, Change every 10 days. Use for personal CGMS. On Multiple insulin shots, checks blood sugars 4 times per day and requires frequent insulin adjustment. , Disp: 3 each, Rfl: 5    Continuous Blood Gluc Transmit (DEXCOM G6 TRANSMITTER) MISC, Change every 3 months, Disp: 1 each, Rfl: 3    ergocalciferol (DRISDOL) 1.25 MG (73158 UT) capsule, Take 1 capsule by mouth once a week, Disp: 12 capsule, Rfl: 3    losartan-hydroCHLOROthiazide (HYZAAR) 50-12.5 MG per tablet, TAKE 1 TABLET BY MOUTH DAILY, Disp: 90 tablet, Rfl: 1    insulin lispro (HUMALOG KWIKPEN U-200) 200 UNIT/ML SOPN pen, Use up to 80 units per day, Disp: 8 pen, Rfl: 3      Review of Systems:    Constitutional: Negative for fever, chills, and unexpected weight change. HENT: Negative for congestion, ear pain, rhinorrhea,  sore throat and trouble swallowing. Eyes: Negative for photophobia, redness, itching. Respiratory: Negative for cough, shortness of breath and sputum. Cardiovascular: Negative for chest pain, palpitations and leg swelling. Gastrointestinal: Negative for nausea, vomiting, abdominal pain, diarrhea, constipation. Endocrine: Negative for cold intolerance, heat intolerance, polydipsia, polyphagia and polyuria. Genitourinary: Negative for dysuria, urgency, frequency, hematuria and flank pain. Musculoskeletal: Negative for myalgias, back pain, arthralgias and neck pain. Skin/Nail: Negative for rash, itching. Normal nails. Neurological: Negative for seizures, weakness, light-headedness, numbness and headaches. Hematological/ Lymph nodes: Negative for adenopathy. Does not bruise/bleed easily. Psychiatric/Behavioral: Negative for suicidal ideas, depression, anxiety, sleep disturbance and decreased concentration. Objective:   Physical Exam:  BP (!) 153/86 (Site: Right Upper Arm, Position: Sitting, Cuff Size: Large Adult)   Pulse 94   Ht 5' 2\" (1.575 m)   Wt 216 lb (98 kg)   LMP 12/09/2020   SpO2 97%   BMI 39.51 kg/m²   Constitutional: Patient is oriented to person, place, and time. Patient appears well-developed and well-nourished. HENT:    Head: Normocephalic and atraumatic. Eyes: Conjunctivae and EOM are normal.     Neck: Normal range of motion.  Thyroid normal. Cardiovascular: Normal rate, regular rhythm and normal heart sounds. Pulmonary/Chest: Effort normal and breath sounds normal.   Abdominal: Soft. Bowel sounds are normal.   Musculoskeletal: Normal range of motion. Neurological: Patient is alert and oriented to person, place, and time. Patient has normal reflexes. Skin: Skin is warm and dry. Psychiatric: Patient has a normal mood and affect.  Patient behavior is normal.     Lab Review:    Orders Only on 12/03/2021   Component Date Value Ref Range Status    Vit D, 25-Hydroxy 12/03/2021 30.6  >=30 ng/mL Final    Hemoglobin A1C 12/03/2021 8.6  See comment % Final    eAG 12/03/2021 200.1  mg/dL Final   Orders Only on 08/31/2021   Component Date Value Ref Range Status    Vit D, 25-Hydroxy 08/31/2021 32.2  >=30 ng/mL Final    Hemoglobin A1C 08/31/2021 7.7  See comment % Final    eAG 08/31/2021 174.3  mg/dL Final   Hospital Outpatient Visit on 06/09/2021   Component Date Value Ref Range Status    Cholesterol, Fasting 06/09/2021 230* 0 - 199 mg/dL Final    Triglyceride, Fasting 06/09/2021 155* 0 - 150 mg/dL Final    HDL 06/09/2021 52  40 - 60 mg/dL Final    LDL Calculated 06/09/2021 147* <100 mg/dL Final    VLDL Cholesterol Calculated 06/09/2021 31  Not Established mg/dL Final    Hemoglobin A1C 06/09/2021 8.7  See comment % Final    eAG 06/09/2021 203.0  mg/dL Final    Sodium 06/09/2021 134* 136 - 145 mmol/L Final    Potassium 06/09/2021 4.2  3.5 - 5.1 mmol/L Final    Chloride 06/09/2021 97* 99 - 110 mmol/L Final    CO2 06/09/2021 27  21 - 32 mmol/L Final    Anion Gap 06/09/2021 10  3 - 16 Final    Glucose 06/09/2021 192* 70 - 99 mg/dL Final    BUN 06/09/2021 13  7 - 20 mg/dL Final    CREATININE 06/09/2021 0.9  0.6 - 1.1 mg/dL Final    GFR Non- 06/09/2021 >60  >60 Final    GFR  06/09/2021 >60  >60 Final    Calcium 06/09/2021 9.4  8.3 - 10.6 mg/dL Final    Total Protein 06/09/2021 7.5  6.4 - 8.2 g/dL Final    Albumin 06/09/2021 4.3  3.4 - 5.0 g/dL Final    Albumin/Globulin Ratio 06/09/2021 1.3  1.1 - 2.2 Final    Total Bilirubin 06/09/2021 0.3  0.0 - 1.0 mg/dL Final    Alkaline Phosphatase 06/09/2021 84  40 - 129 U/L Final    ALT 06/09/2021 18  10 - 40 U/L Final    AST 06/09/2021 24  15 - 37 U/L Final    Globulin 06/09/2021 3.2  g/dL Final    Vit D, 25-Hydroxy 06/09/2021 22.4* >=30 ng/mL Final    Microalbumin, Random Urine 06/09/2021 5.20* <2.0 mg/dL Final    Creatinine, Ur 06/09/2021 37.6  28.0 - 259.0 mg/dL Final    Microalbumin Creatinine Ratio 06/09/2021 138.3* 0.0 - 30.0 mg/g Final   Office Visit on 06/03/2021   Component Date Value Ref Range Status    Diabetic Retinopathy 04/13/2021 Positive   Final           Assessment and Plan     Ileana was seen today for diabetes and thyroid problem. Diagnoses and all orders for this visit:    Background diabetic retinopathy without macular edema associated with type 1 diabetes mellitus ([facility] [address]  -     Continuous Blood Gluc  ([address]) SO; Use for personal CGM  -     Continuous Blood Gluc Sensor (DEXCOM G6 SENSOR) MISC; Change every 10 days. Use for personal CGMS. On Multiple insulin shots, checks blood sugars 4 times per day and requires frequent insulin adjustment. -     Continuous Blood Gluc Transmit (DEXCOM G6 TRANSMITTER) MISC; Change every 3 months    Type 1 diabetes mellitus with diabetic nephropathy (HCC)  -     Hemoglobin A1C; Future  -     Microalbumin / Creatinine Urine Ratio; Future    Dyslipidemia due to type 1 diabetes mellitus (HCC)    Vitamin D deficiency  -     ergocalciferol (DRISDOL) 1.25 MG (54999 UT) capsule; Take 1 capsule by mouth once a week  -     Vitamin D 25 Hydroxy; Future          1: Type 1 DM complicated with nephropathy, retinopathy    Controlled A1C 8.6% < 7.7% < 8.7% <  8.6%     A1C of <8 would be acceptable because of microvascular complications.      Ave glucose : 271 < 173   In target range: 27%, high 73%, low : 0% Pump review show boluses are 53% or more   Over riding 60.9%  Blood sugars are high in the evening, unable to get more than 20 units   She feels correction is not strong enough either     To lose weight, target [place] intake less than 1050 a day     Humalog U-200     Basal rates (units per hour)   MN: 0.70  4 am: 0.60   6:30am changed to 5am:  0.80   10pm: 0.65      Insulin to carb ratio   MN:  8 > 9  6pm:  10 > 9      Correction   MN:  50     Target glucose range:    Active insulin time : 4 hours      Max bolus:  20 units       DEXCOM is expensive. Sending in again as she met deductibles     She does not want to take Victoza for weight loss     All instructions provided in written. Check Blood sugars 4 times per day. Log them along with insulin and send them every 2 weeks. Call for blood sugars less than 60 or more than 400. Eye exam: Last exam in April 2021, reports stable background retinopathy . Foot exam:  June 2021   Deformity/amputation: absent  Skin lesions/pre-ulcerative calluses: absent  Edema: right- negative, left- negative  Sensory exam: Monofilament sensation: normal  Pulses: normal, Vibration (128 Hz): Intact    Renal screen: 229 - Sep 2020, 138 June 2021   TSH screen: normal Sep 2020     2: HTN   Slightly high   ACE inhibitor caused swelling   Losartan-HCTZ 50-12.5 mg daily in am     3: Hyperlipidemia   LDL: 147 , HDL: 52 , TGs: 155 - June 2021     Lipitor caused severe body aches   Check Vit D first and replace     Vit D : 30 - Dec 2021   On ergocalciferol 50K units weekly     She takes MVT and D3 2,000 units daily     Will start low dose crestor once a week when Vit D is better (in 40-50)     4: Multiple thyroid nodules  US June 2021:   Showed multiple thyroid nodules. One was 9mm, TR4.      Repeat US in June-July 2022     RTC in 3 months with A1C, Vit D , MACR      EDUCATION:   Greater than 50% of this visit was spent in general counseling regarding obesity, diet, exercise, importance of adherence to insulin regime, recognition and treatment of hypo and hyperglycemia,  glucose logging, proper diabetes management, diabetic complications with poor management and the importance of glycemic control in order to avoid the complications of diabetes. Risks and potential complications of diabetes were reviewed with the patient. Diabetes health maintenance plan and follow-up were discussed and understood by the patient. We reviewed the importance of medication compliance and regular follow-up. Aggressive lifestyle modification was encouraged. Exercise Counselling: This patient is a candidate for regular physical exercise. Instructions to perform the following types of exercise:  Swimming or water aerobic exercise  Brisk walking  Playing tennis  Stationary bicycle or elliptical indoor  Low impact aerobic exercise    Instructions given to exercise for the following duration:  30 minutes a day for five-seven days per week.     Following instructions for being active throughout the day in addition to formal exercise:  Walk instead of drive whenever possible  Take the stairs instead of the elevator  Work in the garden  Park to the far end of the parking lot to add more walking steps to destination      Electronically signed by Romaine Weir MD on 12/9/2021 at 9:42 AM

## 2022-03-08 DIAGNOSIS — E55.9 VITAMIN D DEFICIENCY: ICD-10-CM

## 2022-03-08 DIAGNOSIS — E10.21 TYPE 1 DIABETES MELLITUS WITH DIABETIC NEPHROPATHY (HCC): ICD-10-CM

## 2022-03-08 LAB — VITAMIN D 25-HYDROXY: 25.6 NG/ML

## 2022-03-09 LAB
ESTIMATED AVERAGE GLUCOSE: 200.1 MG/DL
HBA1C MFR BLD: 8.6 %

## 2022-03-10 ENCOUNTER — OFFICE VISIT (OUTPATIENT)
Dept: ENDOCRINOLOGY | Age: 57
End: 2022-03-10
Payer: COMMERCIAL

## 2022-03-10 VITALS
HEART RATE: 96 BPM | BODY MASS INDEX: 39.71 KG/M2 | SYSTOLIC BLOOD PRESSURE: 158 MMHG | DIASTOLIC BLOOD PRESSURE: 80 MMHG | OXYGEN SATURATION: 98 % | WEIGHT: 215.8 LBS | HEIGHT: 62 IN

## 2022-03-10 DIAGNOSIS — E10.21 TYPE 1 DIABETES MELLITUS WITH DIABETIC NEPHROPATHY (HCC): Primary | ICD-10-CM

## 2022-03-10 DIAGNOSIS — E55.9 VITAMIN D DEFICIENCY: ICD-10-CM

## 2022-03-10 DIAGNOSIS — I10 ESSENTIAL HYPERTENSION: ICD-10-CM

## 2022-03-10 DIAGNOSIS — E10.69 DYSLIPIDEMIA DUE TO TYPE 1 DIABETES MELLITUS (HCC): ICD-10-CM

## 2022-03-10 DIAGNOSIS — Z46.81 INSULIN PUMP TITRATION: ICD-10-CM

## 2022-03-10 DIAGNOSIS — Z96.41 INSULIN PUMP STATUS: ICD-10-CM

## 2022-03-10 DIAGNOSIS — E78.5 DYSLIPIDEMIA DUE TO TYPE 1 DIABETES MELLITUS (HCC): ICD-10-CM

## 2022-03-10 DIAGNOSIS — E04.2 MULTIPLE THYROID NODULES: ICD-10-CM

## 2022-03-10 DIAGNOSIS — E10.3299 BACKGROUND DIABETIC RETINOPATHY WITHOUT MACULAR EDEMA ASSOCIATED WITH TYPE 1 DIABETES MELLITUS (HCC): ICD-10-CM

## 2022-03-10 PROCEDURE — 99214 OFFICE O/P EST MOD 30 MIN: CPT | Performed by: INTERNAL MEDICINE

## 2022-03-10 PROCEDURE — 3052F HG A1C>EQUAL 8.0%<EQUAL 9.0%: CPT | Performed by: INTERNAL MEDICINE

## 2022-03-10 RX ORDER — BLOOD-GLUCOSE TRANSMITTER
EACH MISCELLANEOUS
Qty: 1 EACH | Refills: 3 | Status: SHIPPED | OUTPATIENT
Start: 2022-03-10

## 2022-03-10 RX ORDER — ERGOCALCIFEROL (VITAMIN D2) 1250 MCG
50000 CAPSULE ORAL
Qty: 26 CAPSULE | Refills: 3 | Status: SHIPPED | OUTPATIENT
Start: 2022-03-10 | End: 2022-06-16 | Stop reason: SDUPTHER

## 2022-03-10 RX ORDER — BLOOD-GLUCOSE SENSOR
EACH MISCELLANEOUS
Qty: 9 EACH | Refills: 3 | Status: SHIPPED | OUTPATIENT
Start: 2022-03-10

## 2022-03-10 RX ORDER — BLOOD-GLUCOSE,RECEIVER,CONT
EACH MISCELLANEOUS
Qty: 1 EACH | Refills: 0 | Status: SHIPPED | OUTPATIENT
Start: 2022-03-10

## 2022-03-10 NOTE — PROGRESS NOTES
Patient ID:   Carole Montana is a 49 y.o. female    Chief Complaint:   Carole Montana presents for an evaluation of Type 1 Diabetes Mellitus , Hyperlipidemia and hypertension. Referred by Dr. Damion Le MD.     Subjective:   Type 1 Diabetes Mellitus diagnosed in 8. On insulin since diagnosis. Previously seen by Dr Miles Rojas. She was not happy with the care . Used Humalog U200 for Omnipod     On asking if she counts carbs. She said she tries some times. She said she eats same stuff     Using meter to check sugars     Settings are different as battery went out     Basal rates (units per hour)   MN: 0.60  5am: 0.80   10pm: 0.65      Insulin to carb ratio   MN:  9     Correction   MN:  50     Target glucose range:    Active insulin time : 4 hours      Max bolus:  20 units     Checks blood sugars 2-4 times per day. Reviewed/Reported  AM:    Lunch:    Supper:     HS:      Hypoglycemias: 55 last week before dinner. She does not remember why it was low. Meals: Three, dinner is bigger. Usually no snacks. No sodas. Exercise: Started dance/stretch and strengthening class yesterday. It is twice a week. She will also start doing Howard-chi    Denies chest pain, exertional dyspnea. Family history of CAD: Dad had MI in his 63's   Denies smoking. Counselled for smoking cessation. Denies/Drinks alcohol. Currently on ASA mg daily     Thyroid nodules:   US May 2020   One on right and and one on left   One subcentimeter thyroid nodule in the right thyroid lobe which is smoothly marginated, not taller-than-wide, and with no echogenic foci, deemed to be clinically insignificant, and for which no biopsy or follow up is recommended per ACR TIRADS criteria.     Other potentially more significant thyroid nodule is are detailed below. No new or enlarging thyroid nodule is identified.      Thyroid Nodule Location: Upper left thyroid lobe   Thyroid Nodule Size:  1 cm   Composition: Solid or almost completely solid (2 points)   Echogenicity: Hypoechoic (2 points)   Shape: Wider than tall (0 points)   Margin: Smooth (0 points)   Echogenic foci: None (0 points)   TIRADS Level: TI-RADS 4   Comparison:  No significant change since the comparison thyroid ultrasound scan   Prior Biopsy:  None   Recommendation for this nodule: US followup at 1, 2, 3, and 5 years        The following portions of the patient's history were reviewed and updated as appropriate:       Family History   Problem Relation Age of Onset    Cancer Mother     High Blood Pressure Mother     Thyroid Disease Mother     Diabetes Father     Heart Disease Father     High Blood Pressure Father     Heart Disease Maternal Grandmother     High Blood Pressure Maternal Grandfather     Stroke Paternal Grandmother     Glaucoma Paternal Grandmother     Diabetes Paternal Aunt          Social History     Socioeconomic History    Marital status:      Spouse name: Not on file    Number of children: Not on file    Years of education: Not on file    Highest education level: Not on file   Occupational History    Not on file   Tobacco Use    Smoking status: Never Smoker    Smokeless tobacco: Never Used   Vaping Use    Vaping Use: Never used   Substance and Sexual Activity    Alcohol use: No    Drug use: No    Sexual activity: Not on file   Other Topics Concern    Not on file   Social History Narrative    Not on file     Social Determinants of Health     Financial Resource Strain:     Difficulty of Paying Living Expenses: Not on file   Food Insecurity:     Worried About Running Out of Food in the Last Year: Not on file    [place] of Food in the Last Year: Not on file   Transportation Needs:     Lack of Transportation (Medical): Not on file    Lack of Transportation (Non-Medical):  Not on file   Physical Activity:     Days of Exercise per Week: Not on file    Minutes of Exercise per Session: Not on file   Stress:     Feeling of Stress : Not on file Social Connections:     Frequency of Communication with Friends and Family: Not on file    Frequency of Social Gatherings with Friends and Family: Not on file    Attends Nondenominational Services: Not on file    Active Member of Clubs or Organizations: Not on file    Attends Club or Organization Meetings: Not on file    Marital Status: Not on file   Intimate Partner Violence:     Fear of Current or Ex-Partner: Not on file    Emotionally Abused: Not on file    Physically Abused: Not on file    Sexually Abused: Not on file   Housing Stability:     Unable to Pay for Housing in the Last Year: Not on file    Number of [place] in the Last Year: Not on file    Unstable Housing in the Last Year: Not on file       Past Medical History:   Diagnosis Date    Diabetes mellitus ([facility])     Edema     Hyperlipidemia     Insulin pump in place     Long-term insulin use ([facility])     Low back pain     Thyroid nodule     Type 1 diabetes mellitus with manifestations ([facility])        Past Surgical History:   Procedure Laterality Date    CARPAL TUNNEL RELEASE       SECTION      X3     SECTION      HAND SURGERY      CARPAL X 4 ON RIGHT AND CARPAL X3 ON LEFT         Allergies   Allergen Reactions    Ace Inhibitors Swelling    Other Anaphylaxis    Penicillins      THROAT CLOSES    Atorvastatin      Other reaction(s): Muscle Aches         Current Outpatient Medications:     ergocalciferol (DRISDOL) 1.25 MG (56909 UT) capsule, Take 1 capsule by mouth Twice a Week, Disp: 26 capsule, Rfl: 3    Continuous Blood Gluc Sensor (DEXCOM G6 SENSOR) MISC, Change every 10 days. Use for personal CGMS. On Multiple insulin shots, checks blood sugars 4 times per day and requires frequent insulin adjustment. , Disp: 9 each, Rfl: 3    Continuous Blood Gluc Transmit (DEXCOM G6 TRANSMITTER) MISC, Change every 3 months, Disp: 1 each, Rfl: 3    Continuous Blood Gluc  (DEXCOM G6 ) SO, Use for personal CGM, Exam:  BP (!) 158/80 (Site: Left Upper Arm, Position: Sitting, Cuff Size: Large Adult)   Pulse 96   Ht 5' 2\" (1.575 m)   Wt 215 lb 12.8 oz (97.9 kg)   LMP 11/10/2020   SpO2 98%   BMI 39.47 kg/m²   Constitutional: Patient is oriented to person, place, and time. Patient appears well-developed and well-nourished. HENT:    Head: Normocephalic and atraumatic. Eyes: Conjunctivae and EOM are normal.     Neck: Normal range of motion. Thyroid normal.   Cardiovascular: Normal rate, regular rhythm and normal heart sounds. Pulmonary/Chest: Effort normal and breath sounds normal.   Abdominal: Soft. Bowel sounds are normal.   Musculoskeletal: Normal range of motion. Neurological: Patient is alert and oriented to person, place, and time. Patient has normal reflexes. Skin: Skin is warm and dry. Psychiatric: Patient has a normal mood and affect.  Patient behavior is normal.     Lab Review:    Orders Only on 03/08/2022   Component Date Value Ref Range Status    Vit D, 25-Hydroxy 03/08/2022 25.6* >=30 ng/mL Final    Hemoglobin A1C 03/08/2022 8.6  See comment % Final    eAG 03/08/2022 200.1  mg/dL Final   Orders Only on 12/03/2021   Component Date Value Ref Range Status    Vit D, 25-Hydroxy 12/03/2021 30.6  >=30 ng/mL Final    Hemoglobin A1C 12/03/2021 8.6  See comment % Final    eAG 12/03/2021 200.1  mg/dL Final   Orders Only on 08/31/2021   Component Date Value Ref Range Status    Vit D, 25-Hydroxy 08/31/2021 32.2  >=30 ng/mL Final    Hemoglobin A1C 08/31/2021 7.7  See comment % Final    eAG 08/31/2021 174.3  mg/dL Final   Hospital Outpatient Visit on 06/09/2021   Component Date Value Ref Range Status    Cholesterol, Fasting 06/09/2021 230* 0 - 199 mg/dL Final    Triglyceride, Fasting 06/09/2021 155* 0 - 150 mg/dL Final    HDL 06/09/2021 52  40 - 60 mg/dL Final    LDL Calculated 06/09/2021 147* <100 mg/dL Final    VLDL Cholesterol Calculated 06/09/2021 31  Not Established mg/dL Final    Hemoglobin A1C 06/09/2021 8.7  See comment % Final    eAG 06/09/2021 203.0  mg/dL Final    Sodium 06/09/2021 134* 136 - 145 mmol/L Final    Potassium 06/09/2021 4.2  3.5 - 5.1 mmol/L Final    Chloride 06/09/2021 97* 99 - 110 mmol/L Final    CO2 06/09/2021 27  21 - 32 mmol/L Final    Anion Gap 06/09/2021 10  3 - 16 Final    Glucose 06/09/2021 192* 70 - 99 mg/dL Final    BUN 06/09/2021 13  7 - 20 mg/dL Final    CREATININE 06/09/2021 0.9  0.6 - 1.1 mg/dL Final    GFR Non- 06/09/2021 >60  >60 Final    GFR  06/09/2021 >60  >60 Final    Calcium 06/09/2021 9.4  8.3 - 10.6 mg/dL Final    Total Protein 06/09/2021 7.5  6.4 - 8.2 g/dL Final    Albumin 06/09/2021 4.3  3.4 - 5.0 g/dL Final    Albumin/Globulin Ratio 06/09/2021 1.3  1.1 - 2.2 Final    Total Bilirubin 06/09/2021 0.3  0.0 - 1.0 mg/dL Final    Alkaline Phosphatase 06/09/2021 84  40 - 129 U/L Final    ALT 06/09/2021 18  10 - 40 U/L Final    AST 06/09/2021 24  15 - 37 U/L Final    Globulin 06/09/2021 3.2  g/dL Final    Vit D, 25-Hydroxy 06/09/2021 22.4* >=30 ng/mL Final    Microalbumin, Random Urine 06/09/2021 5.20* <2.0 mg/dL Final    Creatinine, Ur 06/09/2021 37.6  28.0 - 259.0 mg/dL Final    Microalbumin Creatinine Ratio 06/09/2021 138.3* 0.0 - 30.0 mg/g Final   Office Visit on 06/03/2021   Component Date Value Ref Range Status    Diabetic Retinopathy 04/13/2021 Positive   Final           Assessment and Plan     Ileana was seen today for diabetes. 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 Use for personal CGMS.  On Multiple insulin shots, checks blood sugars 4 times per day and requires frequent insulin adjustment. -     Continuous Blood Gluc Transmit (DEXCOM G6 TRANSMITTER) MISC; Change every 3 months  -     Continuous Blood Gluc  (DEXCOM G6 ) SO; Use for personal CGM  -     insulin lispro (HUMALOG KWIKPEN U-200) 200 UNIT/ML SOPN pen; Use up to 80 units per day    Vitamin D deficiency  -     ergocalciferol (DRISDOL) 1.25 MG (36476 UT) capsule; Take 1 capsule by mouth Twice a Week  -     Hemoglobin A1C; Future  -     Lipid, Fasting; Future  -     Comprehensive Metabolic Panel; Future  -     Microalbumin / Creatinine Urine Ratio; Future  -     Vitamin D 25 Hydroxy; Future  -     Continuous Blood Gluc Sensor (DEXCOM G6 SENSOR) MISC; Change every 10 days. Use for personal CGMS. On Multiple insulin shots, checks blood sugars 4 times per day and requires frequent insulin adjustment. -     Continuous Blood Gluc Transmit (DEXCOM G6 TRANSMITTER) MISC; Change every 3 months  -     Continuous Blood Gluc  (DEXCOM G6 ) SO; Use for personal CGM    Background diabetic retinopathy without macular edema associated with type 1 diabetes mellitus (H[facility])  -     Hemoglobin A1C; Future  -     Lipid, Fasting; Future  -     Comprehensive Metabolic Panel; Future  -     Microalbumin / Creatinine Urine Ratio; Future  -     Vitamin D 25 Hydroxy; Future  -     Continuous Blood Gluc Sensor (DEXCOM G6 SENSOR) MISC; Change every 10 days. Use for personal CGMS. On Multiple insulin shots, checks blood sugars 4 times per day and requires frequent insulin adjustment. -     Continuous Blood Gluc Transmit (DEXCOM G6 TRANSMITTER) MISC; Change every 3 months  -     Continuous Blood Gluc  (DEXCOM G6 ) SO; Use for personal CGM    Dyslipidemia due to type [facility] diabetes mellitus ([facility]  -     Hemoglobin A1C; Future  -     Lipid, Fasting; Future  -     Comprehensive Metabolic Panel; Future  -     Microalbumin / Creatinine Urine Ratio; Future  -     Vitamin D 25 Hydroxy;  Future  - Continuous Blood Gluc Sensor (DEXCOM G6 SENSOR) MISC; Change every 10 days. Use for personal CGMS. On Multiple insulin shots, checks blood sugars 4 times per day and requires frequent insulin adjustment. -     Continuous Blood Gluc Transmit (DEXCOM G6 TRANSMITTER) MISC; Change every 3 months  -     Continuous Blood Gluc  (DEXCOM G6 ) SO; Use for personal CGM    Essential hypertension  -     Hemoglobin A1C; Future  -     Lipid, Fasting; Future  -     Comprehensive Metabolic Panel; Future  -     Microalbumin / Creatinine Urine Ratio; Future  -     Vitamin D 25 Hydroxy; Future  -     Continuous Blood Gluc Sensor (DEXCOM G6 SENSOR) MISC; Change every 10 days. Use for personal CGMS. On Multiple insulin shots, checks blood sugars 4 times per day and requires frequent insulin adjustment. -     Continuous Blood Gluc Transmit (DEXCOM G6 TRANSMITTER) MISC; Change every 3 months  -     Continuous Blood Gluc  (DEXCOM G6 ) SO; Use for personal CGM    Insulin pump status  -     Hemoglobin A1C; Future  -     Lipid, Fasting; Future  -     Comprehensive Metabolic Panel; Future  -     Microalbumin / Creatinine Urine Ratio; Future  -     Vitamin D 25 Hydroxy; Future  -     Continuous Blood Gluc Sensor (DEXCOM G6 SENSOR) MISC; Change every 10 days. Use for personal CGMS. On Multiple insulin shots, checks blood sugars 4 times per day and requires frequent insulin adjustment. -     Continuous Blood Gluc Transmit (DEXCOM G6 TRANSMITTER) MISC; Change every 3 months  -     Continuous Blood Gluc  (DEXCOM G6 ) SO; Use for personal CGM    Insulin pump titration  -     Hemoglobin A1C; Future  -     Lipid, Fasting; Future  -     Comprehensive Metabolic Panel; Future  -     Microalbumin / Creatinine Urine Ratio; Future  -     Vitamin D 25 Hydroxy; Future  -     Continuous Blood Gluc Sensor (DEXCOM G6 SENSOR) MISC; Change every 10 days. Use for personal CGMS.  On Multiple insulin shots, checks blood sugars 4 times per day and requires frequent insulin adjustment. -     Continuous Blood Gluc Transmit (DEXCOM G6 TRANSMITTER) MISC; Change every 3 months  -     Continuous Blood Gluc  (DEXCOM G6 ) SO; Use for personal CGM    Multiple thyroid nodules  -     Hemoglobin A1C; Future  -     Lipid, Fasting; Future  -     Comprehensive Metabolic Panel; Future  -     Microalbumin / Creatinine Urine Ratio; Future  -     Vitamin D 25 Hydroxy; Future  -     US THYROID; Future  -     Continuous Blood Gluc Sensor (DEXCOM G6 SENSOR) MISC; Change every 10 days. Use for personal CGMS. On Multiple insulin shots, checks blood sugars 4 times per day and requires frequent insulin adjustment. -     Continuous Blood Gluc Transmit (DEXCOM G6 TRANSMITTER) MISC; Change every 3 months  -     Continuous Blood Gluc  (DEXCOM G6 ) SO; Use for personal CGM          1: Type 1 DM complicated with nephropathy, retinopathy    Controlled A1C 8.6% < 8.6% < 7.7% < 8.7% <  8.6%     A1C of <8 would be acceptable because of microvascular complications. Ave glucose :209 <  271 < 173   In target range: 31%, high 25%, low : 0%   Pump review show boluses are 54% or more   Over riding 60.9%  Blood sugars are high in the evening, unable to get more than 20 units   She feels correction is not strong enough either     To lose weight, target [place] intake less than 1050 a day     Humalog U-200     Basal rates (units per hour)   MN: 0.60 > 0.65  5am: 0.80   10pm: 0.65      Insulin to carb ratio   MN:  9      Correction   MN:  50 > 45     Target glucose range:120   Correct above 120   Active insulin time : 4 hours      Max bolus:  20 units       DEXCOM is expensive. Sending in again as she met deductibles     She does not want to take Victoza for weight loss     All instructions provided in written. Check Blood sugars 4 times per day. Log them along with insulin and send them every 2 weeks.  Call for blood sugars less than 60 or more than 400. Eye exam: Last exam in April 2021, reports stable background retinopathy . Foot exam:  June 2021   Deformity/amputation: absent  Skin lesions/pre-ulcerative calluses: absent  Edema: right- negative, left- negative  Sensory exam: Monofilament sensation: normal  Pulses: normal, Vibration (128 Hz): Intact    Renal screen: 229 - Sep 2020, 138 June 2021   TSH screen: normal Sep 2020     2: HTN   Slightly high   ACE inhibitor caused swelling   Losartan-HCTZ 50-12.5 mg daily in am     3: Hyperlipidemia   LDL: 147 , HDL: 52 , TGs: 155 - June 2021     Lipitor caused severe body aches   Check Vit D first and replace     Vit D : 25.6 - March 2022   On ergocalciferol 50K units weekly - change to twice weekly     She takes MVT and D3 2,000 units daily     Will start low dose crestor once a week when Vit D is better (in 40-50)     4: Multiple thyroid nodules  US June 2021:   Showed multiple thyroid nodules. One was 9mm, TR4. Repeat US in June-July 2022     RTC in 3 months with A1C, Vit D , lipids, CMP, MACR, US      EDUCATION:   Greater than 50% of this visit was spent in general counseling regarding obesity, diet, exercise, importance of adherence to insulin regime, recognition and treatment of hypo and hyperglycemia,  glucose logging, proper diabetes management, diabetic complications with poor management and the importance of glycemic control in order to avoid the complications of diabetes. Risks and potential complications of diabetes were reviewed with the patient. Diabetes health maintenance plan and follow-up were discussed and understood by the patient. We reviewed the importance of medication compliance and regular follow-up. Aggressive lifestyle modification was encouraged. Exercise Counselling: This patient is a candidate for regular physical exercise.  Instructions to perform the following types of exercise:  Swimming or water aerobic exercise  Brisk walking  Playing tennis  Stationary bicycle or elliptical indoor  Low impact aerobic exercise    Instructions given to exercise for the following duration:  30 minutes a day for five-seven days per week.     Following instructions for being active throughout the day in addition to formal exercise:  Walk instead of drive whenever possible  Take the stairs instead of the elevator  Work in the garden  Park to the far end of the parking lot to add more walking steps to destination      Electronically signed by Jumana Bailey MD on 3/10/2022 at 9:03 AM

## 2022-03-29 DIAGNOSIS — E10.21 TYPE 1 DIABETES MELLITUS WITH DIABETIC NEPHROPATHY (HCC): ICD-10-CM

## 2022-03-29 PROCEDURE — 3052F HG A1C>EQUAL 8.0%<EQUAL 9.0%: CPT | Performed by: INTERNAL MEDICINE

## 2022-03-29 RX ORDER — INSULIN LISPRO 200 [IU]/ML
INJECTION, SOLUTION SUBCUTANEOUS
Qty: 24 ML | OUTPATIENT
Start: 2022-03-29

## 2022-05-02 ENCOUNTER — HOSPITAL ENCOUNTER (OUTPATIENT)
Dept: ULTRASOUND IMAGING | Age: 57
Discharge: HOME | End: 2022-05-02
Payer: COMMERCIAL

## 2022-05-02 DIAGNOSIS — E04.2 MULTIPLE THYROID NODULES: ICD-10-CM

## 2022-05-02 PROCEDURE — 76536 US EXAM OF HEAD AND NECK: CPT

## 2022-06-03 DIAGNOSIS — E55.9 VITAMIN D DEFICIENCY: ICD-10-CM

## 2022-06-03 DIAGNOSIS — Z46.81 INSULIN PUMP TITRATION: ICD-10-CM

## 2022-06-03 DIAGNOSIS — E10.21 TYPE 1 DIABETES MELLITUS WITH DIABETIC NEPHROPATHY (HCC): ICD-10-CM

## 2022-06-03 DIAGNOSIS — Z96.41 INSULIN PUMP STATUS: ICD-10-CM

## 2022-06-03 DIAGNOSIS — E10.69 DYSLIPIDEMIA DUE TO TYPE 1 DIABETES MELLITUS (HCC): ICD-10-CM

## 2022-06-03 DIAGNOSIS — E78.5 DYSLIPIDEMIA DUE TO TYPE 1 DIABETES MELLITUS (HCC): ICD-10-CM

## 2022-06-03 DIAGNOSIS — E10.3299 BACKGROUND DIABETIC RETINOPATHY WITHOUT MACULAR EDEMA ASSOCIATED WITH TYPE 1 DIABETES MELLITUS (HCC): ICD-10-CM

## 2022-06-03 DIAGNOSIS — I10 ESSENTIAL HYPERTENSION: ICD-10-CM

## 2022-06-03 DIAGNOSIS — E04.2 MULTIPLE THYROID NODULES: ICD-10-CM

## 2022-06-03 RX ORDER — LOSARTAN POTASSIUM AND HYDROCHLOROTHIAZIDE 12.5; 5 MG/1; MG/1
1 TABLET ORAL DAILY
Qty: 90 TABLET | Refills: 1 | Status: SHIPPED | OUTPATIENT
Start: 2022-06-03

## 2022-06-16 ENCOUNTER — TELEPHONE (OUTPATIENT)
Dept: ENDOCRINOLOGY | Age: 57
End: 2022-06-16

## 2022-06-16 ENCOUNTER — OFFICE VISIT (OUTPATIENT)
Dept: ENDOCRINOLOGY | Age: 57
End: 2022-06-16
Payer: COMMERCIAL

## 2022-06-16 VITALS
WEIGHT: 213 LBS | DIASTOLIC BLOOD PRESSURE: 79 MMHG | BODY MASS INDEX: 38.96 KG/M2 | SYSTOLIC BLOOD PRESSURE: 140 MMHG | HEART RATE: 89 BPM

## 2022-06-16 DIAGNOSIS — E04.2 MULTIPLE THYROID NODULES: ICD-10-CM

## 2022-06-16 DIAGNOSIS — E78.5 DYSLIPIDEMIA DUE TO TYPE 1 DIABETES MELLITUS (HCC): ICD-10-CM

## 2022-06-16 DIAGNOSIS — I10 ESSENTIAL HYPERTENSION: ICD-10-CM

## 2022-06-16 DIAGNOSIS — E10.21 TYPE 1 DIABETES MELLITUS WITH DIABETIC NEPHROPATHY (HCC): ICD-10-CM

## 2022-06-16 DIAGNOSIS — E10.3299 BACKGROUND DIABETIC RETINOPATHY WITHOUT MACULAR EDEMA ASSOCIATED WITH TYPE 1 DIABETES MELLITUS (HCC): Primary | ICD-10-CM

## 2022-06-16 DIAGNOSIS — Z96.41 INSULIN PUMP STATUS: ICD-10-CM

## 2022-06-16 DIAGNOSIS — E10.69 DYSLIPIDEMIA DUE TO TYPE 1 DIABETES MELLITUS (HCC): ICD-10-CM

## 2022-06-16 DIAGNOSIS — E55.9 VITAMIN D DEFICIENCY: ICD-10-CM

## 2022-06-16 PROCEDURE — 99214 OFFICE O/P EST MOD 30 MIN: CPT | Performed by: INTERNAL MEDICINE

## 2022-06-16 PROCEDURE — 3052F HG A1C>EQUAL 8.0%<EQUAL 9.0%: CPT | Performed by: INTERNAL MEDICINE

## 2022-06-16 RX ORDER — ERGOCALCIFEROL (VITAMIN D2) 1250 MCG
50000 CAPSULE ORAL
Qty: 26 CAPSULE | Refills: 3 | Status: SHIPPED | OUTPATIENT
Start: 2022-06-16 | End: 2022-07-16

## 2022-06-16 RX ORDER — INSULIN PUMP CART,CONT INF,RF
CARTRIDGE (EA) SUBCUTANEOUS
COMMUNITY

## 2022-06-16 NOTE — PROGRESS NOTES
Patient ID:   Lilian Kimball is a 49 y.o. female    Chief Complaint:   Lilian Kimball presents for an evaluation of Type 1 Diabetes Mellitus , Hyperlipidemia and hypertension. Subjective:   Type 1 Diabetes Mellitus diagnosed in 8. On insulin since diagnosis. Previously seen by Dr Miles Rodas. She was not happy with the care . Used Humalog U200 for Omnipod     On asking if she counts carbs. She said she tries some times. She said she eats same stuff     Using meter to check sugars     Settings are different as battery went out     Basal rates (units per hour)   MN: 0.65  5am: 0.80   10pm: 0.65      Insulin to carb ratio   MN:  9      Correction   MN: 45     Target glucose range:120   Correct above 120   Active insulin time : 4 hours      Max bolus:  20 units     Da is moving in a senior care community. So blood sugars were low yesterday. 2 pods out of 10 are not working, so blood sugars are running high. She has to change POD. Checks blood sugars 2-4 times per day. Reviewed/Reported  AM:    Lunch:    Supper:     HS:      Hypoglycemias: 55 last week before dinner. She does not remember why it was low. Meals: Three, dinner is bigger. Usually no snacks. No sodas. Exercise: Started dance/stretch and strengthening class yesterday. It is twice a week. She will also start doing Howard-chi    Denies chest pain, exertional dyspnea. Family history of CAD: Dad had MI in his 63's   Denies smoking. Counselled for smoking cessation. Denies/Drinks alcohol. Currently on ASA mg daily     Thyroid nodules:   US May 2020   One on right and and one on left   One subcentimeter thyroid nodule in the right thyroid lobe which is smoothly marginated, not taller-than-wide, and with no echogenic foci, deemed to be clinically insignificant, and for which no biopsy or follow up is recommended per ACR TIRADS criteria.     Other potentially more significant thyroid nodule is are detailed below.  No new or enlarging thyroid nodule is identified. Thyroid Nodule Location: Upper left thyroid lobe   Thyroid Nodule Size:  1 cm   Composition: Solid or almost completely solid (2 points)   Echogenicity: Hypoechoic (2 points)   Shape: Wider than tall (0 points)   Margin: Smooth (0 points)   Echogenic foci: None (0 points)   TIRADS Level: TI-RADS 4   Comparison:  No significant change since the comparison thyroid ultrasound scan   Prior Biopsy:  None   Recommendation for this nodule: US followup at 1, 2, 3, and 5 years        The following portions of the patient's history were reviewed and updated as appropriate:       Family History   Problem Relation Age of Onset    Cancer Mother     High Blood Pressure Mother     Thyroid Disease Mother     Diabetes Father     Heart Disease Father     High Blood Pressure Father     Heart Disease Maternal Grandmother     High Blood Pressure Maternal Grandfather     Stroke Paternal Grandmother     Glaucoma Paternal Grandmother     Diabetes Paternal Aunt          Social History     Socioeconomic History    Marital status:      Spouse name: Not on file    Number of children: Not on file    Years of education: Not on file    Highest education level: Not on file   Occupational History    Not on file   Tobacco Use    Smoking status: Never Smoker    Smokeless tobacco: Never Used   Vaping Use    Vaping Use: Never used   Substance and Sexual Activity    Alcohol use: No    Drug use: No    Sexual activity: Not on file   Other Topics Concern    Not on file   Social History Narrative    Not on file     Social Determinants of Health     Financial Resource Strain:     Difficulty of Paying Living Expenses: Not on file   Food Insecurity:     Worried About Running Out of Food in the Last Year: Not on file    [place] of Food in the Last Year: Not on file   Transportation Needs:     Lack of Transportation (Medical): Not on file    Lack of Transportation (Non-Medical):  Not on file   Physical Activity:     Days of Exercise per Week: Not on file    Minutes of Exercise per Session: Not on file   Stress:     Feeling of Stress : Not on file   Social Connections:     Frequency of Communication with Friends and Family: Not on file    Frequency of Social Gatherings with Friends and Family: Not on file    Attends Temple Services: Not on file    Active Member of [address] [place] [address] or Organizations: Not on file    Attends Club or Organization Meetings: Not on file    Marital Status: Not on file   Intimate Partner Violence:     Fear of Current or Ex-Partner: Not on file    Emotionally Abused: Not on file    Physically Abused: Not on file    Sexually Abused: Not on file   Housing Stability:     Unable to Pay for Housing in the Last Year: Not on file    Number of [place] in the Last Year: Not on file    Unstable Housing in the Last Year: Not on file       Past Medical History:   Diagnosis Date    Diabetes mellitus ([facility] [address]     Edema     Hyperlipidemia     Insulin pump in place     Long-term insulin use ([facility] [facility]     Low back pain     Thyroid nodule     Type 1 diabetes mellitus with manifestations ([facility] [address]        Past Surgical History:   Procedure Laterality Date    CARPAL TUNNEL RELEASE       SECTION      X3     SECTION      HAND SURGERY      CARPAL X 4 ON RIGHT AND CARPAL X3 ON LEFT         Allergies   Allergen Reactions    Ace Inhibitors Swelling    Other Anaphylaxis    Penicillins      THROAT CLOSES    Atorvastatin      Other reaction(s): Muscle Aches         Current Outpatient Medications:     Insulin Disposable Pump (OMNIPOD CLASSIC PODS, GEN 3,) MISC, by Does not apply route, Disp: , Rfl:     ergocalciferol (DRISDOL) 1.25 MG (90073 UT) capsule, Take 1 capsule by mouth Twice a Week, Disp: 26 capsule, Rfl: 3    losartan-hydroCHLOROthiazide (HYZAAR) 50-12.5 MG per tablet, TAKE 1 TABLET BY MOUTH DAILY, Disp: 90 tablet, Rfl: 1    Continuous Blood Gluc Transmit (DEXCOM G6 TRANSMITTER) MISC, Change every 3 months, Disp: 1 each, Rfl: 3    insulin lispro (HUMALOG KWIKPEN U-200) 200 UNIT/ML SOPN pen, Use up to 80 units per day, Disp: 12 pen, Rfl: 2    Continuous Blood Gluc Sensor (DEXCOM G6 SENSOR) MISC, Change every 10 days. Use for personal CGMS. On Multiple insulin shots, checks blood sugars 4 times per day and requires frequent insulin adjustment. (Patient not taking: Reported on 6/16/2022), Disp: 9 each, Rfl: 3    Continuous Blood Gluc  ([address]) SO, Use for personal CGM (Patient not taking: Reported on 6/16/2022), Disp: 1 each, Rfl: 0    Continuous Blood Gluc  ([address]) SO, Use for personal CGM (Patient not taking: Reported on 6/16/2022), Disp: 1 each, Rfl: 0    Continuous Blood Gluc Sensor (DEXCOM G6 SENSOR) MISC, Change every 10 days. Use for personal CGMS. On Multiple insulin shots, checks blood sugars 4 times per day and requires frequent insulin adjustment. (Patient not taking: Reported on 6/16/2022), Disp: 3 each, Rfl: 5      Review of Systems:    Constitutional: Negative for fever, chills, and unexpected weight change. HENT: Negative for congestion, ear pain, rhinorrhea,  sore throat and trouble swallowing. Eyes: Negative for photophobia, redness, itching. Respiratory: Negative for cough, shortness of breath and sputum. Cardiovascular: Negative for chest pain, palpitations and leg swelling. Gastrointestinal: Negative for nausea, vomiting, abdominal pain, diarrhea, constipation. Endocrine: Negative for cold intolerance, heat intolerance, polydipsia, polyphagia and polyuria. Genitourinary: Negative for dysuria, urgency, frequency, hematuria and flank pain. Musculoskeletal: Negative for myalgias, back pain, arthralgias and neck pain. Skin/Nail: Negative for rash, itching. Normal nails. Neurological: Negative for seizures, weakness, light-headedness, numbness and headaches.    Hematological/ Lymph nodes: Negative for adenopathy. Does not bruise/bleed easily. Psychiatric/Behavioral: Negative for suicidal ideas, depression, anxiety, sleep disturbance and decreased concentration. Objective:   Physical Exam:  BP (!) 140/79   Pulse 89   Wt 213 lb (96.6 kg)   LMP 03/06/2021   BMI 38.96 kg/m²   Constitutional: Patient is oriented to person, place, and time. Patient appears well-developed and well-nourished. HENT:    Head: Normocephalic and atraumatic. Eyes: Conjunctivae and EOM are normal.     Neck: Normal range of motion. Thyroid normal.   Cardiovascular: Normal rate, regular rhythm and normal heart sounds. Pulmonary/Chest: Effort normal and breath sounds normal.   Musculoskeletal: Normal range of motion. Neurological: Patient is alert and oriented to person, place, and time. Patient has normal reflexes. Skin: Skin is warm and dry. Psychiatric: Patient has a normal mood and affect. Patient behavior is normal.     Lab Review:    Orders Only on 03/08/2022   Component Date Value Ref Range Status    Vit D, 25-Hydroxy 03/08/2022 25.6* >=30 ng/mL Final    Hemoglobin A1C 03/08/2022 8.6  See comment % Final    eAG 03/08/2022 200.1  mg/dL Final   Orders Only on 12/03/2021   Component Date Value Ref Range Status    Vit D, 25-Hydroxy 12/03/2021 30.6  >=30 ng/mL Final    Hemoglobin A1C 12/03/2021 8.6  See comment % Final    eAG 12/03/2021 200.1  mg/dL Final   Orders Only on 08/31/2021   Component Date Value Ref Range Status    Vit D, 25-Hydroxy 08/31/2021 32.2  >=30 ng/mL Final    Hemoglobin A1C 08/31/2021 7.7  See comment % Final    eAG 08/31/2021 174.3  mg/dL Final           Assessment and Plan     Ileana was seen today for diabetes. Diagnoses and all orders for this visit:    Background diabetic retinopathy without macular edema associated with type 1 diabetes mellitus ([facility] [address]  -     Hemoglobin A1C; Future    Vitamin D deficiency  -     ergocalciferol (DRISDOL) 1.25 MG (01429 UT) capsule;  Take 1 capsule by mouth Twice a Week    Type 1 diabetes mellitus with diabetic nephropathy (HCC)  -     Hemoglobin A1C; Future    Essential hypertension    Dyslipidemia due to type 1 diabetes mellitus (HCC)    Insulin pump status    Multiple thyroid nodules          1: Type 1 DM complicated with nephropathy, retinopathy    Uncontrolled A1C 8.6% < 8.6% < 7.7% < 8.7% <  8.6%     A1C of <8 would be acceptable because of microvascular complications. We could not download as Muriel Oliveros was not working. She will download at home and send me. She has 20% failures on PODS. She will need PA for [facility]. She I stype 1 with insulin pump. Sensor will be helpful as A1C is high. To lose weight, target Hernan intake less than 1050 a day     Humalog U-200     Basal rates (units per hour)   MN: 0.65  5am: 0.80   10pm: 0.65      Insulin to carb ratio   MN:  9      Correction   MN: 45     Target glucose range:120   Correct above 120   Active insulin time : 4 hours      Max bolus:  20 units       DEXCOM is expensive. Sending in again as she met deductibles     She does not want to take Victoza for weight loss     All instructions provided in written. Check Blood sugars 4 times per day. Log them along with insulin and send them every 2 weeks. Call for blood sugars less than 60 or more than 400. Eye exam: Last exam in April 2021, reports stable background retinopathy . Due for exam.   Foot exam:  June 2021   Deformity/amputation: absent  Skin lesions/pre-ulcerative calluses: absent  Edema: right- negative, left- negative  Sensory exam: Monofilament sensation: normal  Pulses: normal, Vibration (128 Hz):  Intact    Renal screen: 229 - Sep 2020, 138 June 2021   TSH screen: normal Sep 2020     2: HTN   Slightly high   ACE inhibitor caused swelling   Losartan-HCTZ 50-12.5 mg daily in am     3: Hyperlipidemia   LDL: 147 , HDL: 52 , TGs: 155 - June 2021     Lipitor caused severe body aches   Check Vit D first and replace     Vit D : 25.6 - March 2022   On ergocalciferol 50K units weekly. . Change to twice weekly     She takes MVT and D3 2,000 units daily     Will start low dose crestor once a week when Vit D is better (in 40-50)     4: Multiple thyroid nodules  US June 2021:   Showed multiple thyroid nodules. One was 9mm, TR4. Repeat US May 2022: Unchanged 0.7 cm TI-RADS TR3 nodule in the mid right thyroid lobe. A 1.0 cm TR2 nodule in the upper to mid left thyroid lobe is unchanged in size but has a slightly increased cystic component resulting in a decreased TI-RADS score (previously TR3). No dedicated follow-up is recommended. Will repeat US in 2 years, June 2024     Blood work next week, fasting: A1C, Vit D , lipids, CMP, MACR,    RTC in 3 months with A1C      EDUCATION:   Greater than 50% of this visit was spent in general counseling regarding obesity, diet, exercise, importance of adherence to insulin regime, recognition and treatment of hypo and hyperglycemia,  glucose logging, proper diabetes management, diabetic complications with poor management and the importance of glycemic control in order to avoid the complications of diabetes. Risks and potential complications of diabetes were reviewed with the patient. Diabetes health maintenance plan and follow-up were discussed and understood by the patient. We reviewed the importance of medication compliance and regular follow-up. Aggressive lifestyle modification was encouraged. Exercise Counselling: This patient is a candidate for regular physical exercise. Instructions to perform the following types of exercise:  Swimming or water aerobic exercise  Brisk walking  Playing tennis  Stationary bicycle or elliptical indoor  Low impact aerobic exercise    Instructions given to exercise for the following duration:  30 minutes a day for five-seven days per week.     Following instructions for being active throughout the day in addition to formal exercise:  Walk instead of drive whenever possible  Take the stairs instead of the elevator  Work in the garden  Park to the far end of the parking lot to add more walking steps to destination      Electronically signed by Fanny Romero MD on 6/16/2022 at 8:47 AM

## 2022-06-17 NOTE — TELEPHONE ENCOUNTER
Called pt and informed of below. Pt aware PA can be initiated on or after 07/01/22. Pt verbalized understanding.

## 2022-08-18 ENCOUNTER — TELEPHONE (OUTPATIENT)
Dept: ENDOCRINOLOGY | Age: 57
End: 2022-08-18

## 2022-08-22 NOTE — TELEPHONE ENCOUNTER
Tad TOM Case ID: [id number] - Rx #: [id number]  Outcome  Approved today  [id number];XECNWB:[id number]; Review Type:Prior Auth; Coverage Start Date:07/23/2022; Coverage End Date:08/22/2023;

## 2022-08-27 DIAGNOSIS — E04.2 MULTIPLE THYROID NODULES: ICD-10-CM

## 2022-08-27 DIAGNOSIS — Z46.81 INSULIN PUMP TITRATION: ICD-10-CM

## 2022-08-27 DIAGNOSIS — E78.5 DYSLIPIDEMIA DUE TO TYPE 1 DIABETES MELLITUS (HCC): ICD-10-CM

## 2022-08-27 DIAGNOSIS — Z96.41 INSULIN PUMP STATUS: ICD-10-CM

## 2022-08-27 DIAGNOSIS — E10.69 DYSLIPIDEMIA DUE TO TYPE 1 DIABETES MELLITUS (HCC): ICD-10-CM

## 2022-08-27 DIAGNOSIS — E10.3299 BACKGROUND DIABETIC RETINOPATHY WITHOUT MACULAR EDEMA ASSOCIATED WITH TYPE 1 DIABETES MELLITUS (HCC): ICD-10-CM

## 2022-08-27 DIAGNOSIS — E10.21 TYPE 1 DIABETES MELLITUS WITH DIABETIC NEPHROPATHY (HCC): ICD-10-CM

## 2022-08-27 DIAGNOSIS — I10 ESSENTIAL HYPERTENSION: ICD-10-CM

## 2022-08-27 DIAGNOSIS — E55.9 VITAMIN D DEFICIENCY: ICD-10-CM

## 2022-08-27 LAB
A/G RATIO: 1.7 (ref 1.1–2.2)
ALBUMIN SERPL-MCNC: 4.2 G/DL (ref 3.4–5)
ALP BLD-CCNC: 84 U/L (ref 40–129)
ALT SERPL-CCNC: 15 U/L (ref 10–40)
ANION GAP SERPL CALCULATED.3IONS-SCNC: 11 MMOL/L (ref 3–16)
AST SERPL-CCNC: 14 U/L (ref 15–37)
BILIRUB SERPL-MCNC: 0.4 MG/DL (ref 0–1)
BUN BLDV-MCNC: 15 MG/DL (ref 7–20)
CALCIUM SERPL-MCNC: 9.5 MG/DL (ref 8.3–10.6)
CHLORIDE BLD-SCNC: 104 MMOL/L (ref 99–110)
CHOLESTEROL, FASTING: 216 MG/DL (ref 0–199)
CO2: 28 MMOL/L (ref 21–32)
CREAT SERPL-MCNC: 1 MG/DL (ref 0.6–1.1)
CREATININE URINE: 142.9 MG/DL (ref 28–259)
GFR AFRICAN AMERICAN: >60
GFR NON-AFRICAN AMERICAN: 58
GLUCOSE BLD-MCNC: 223 MG/DL (ref 70–99)
HDLC SERPL-MCNC: 50 MG/DL (ref 40–60)
LDL CHOLESTEROL CALCULATED: 147 MG/DL
MICROALBUMIN UR-MCNC: 14.1 MG/DL
MICROALBUMIN/CREAT UR-RTO: 98.7 MG/G (ref 0–30)
POTASSIUM SERPL-SCNC: 5.1 MMOL/L (ref 3.5–5.1)
SODIUM BLD-SCNC: 143 MMOL/L (ref 136–145)
TOTAL PROTEIN: 6.7 G/DL (ref 6.4–8.2)
TRIGLYCERIDE, FASTING: 96 MG/DL (ref 0–150)
VITAMIN D 25-HYDROXY: 57.3 NG/ML
VLDLC SERPL CALC-MCNC: 19 MG/DL

## 2022-08-28 LAB
ESTIMATED AVERAGE GLUCOSE: 191.5 MG/DL
HBA1C MFR BLD: 8.3 %

## 2022-09-23 ENCOUNTER — OFFICE VISIT (OUTPATIENT)
Dept: ENDOCRINOLOGY | Age: 57
End: 2022-09-23
Payer: COMMERCIAL

## 2022-09-23 VITALS
OXYGEN SATURATION: 96 % | SYSTOLIC BLOOD PRESSURE: 131 MMHG | DIASTOLIC BLOOD PRESSURE: 71 MMHG | HEIGHT: 62 IN | WEIGHT: 214.4 LBS | HEART RATE: 88 BPM | BODY MASS INDEX: 39.45 KG/M2

## 2022-09-23 DIAGNOSIS — E10.3299 BACKGROUND DIABETIC RETINOPATHY WITHOUT MACULAR EDEMA ASSOCIATED WITH TYPE 1 DIABETES MELLITUS (HCC): ICD-10-CM

## 2022-09-23 DIAGNOSIS — E55.9 VITAMIN D DEFICIENCY: ICD-10-CM

## 2022-09-23 DIAGNOSIS — E78.5 DYSLIPIDEMIA DUE TO TYPE 1 DIABETES MELLITUS (HCC): ICD-10-CM

## 2022-09-23 DIAGNOSIS — E10.69 DYSLIPIDEMIA DUE TO TYPE 1 DIABETES MELLITUS (HCC): ICD-10-CM

## 2022-09-23 DIAGNOSIS — E10.21 TYPE 1 DIABETES MELLITUS WITH DIABETIC NEPHROPATHY (HCC): Primary | ICD-10-CM

## 2022-09-23 DIAGNOSIS — I10 ESSENTIAL HYPERTENSION: ICD-10-CM

## 2022-09-23 PROCEDURE — 3052F HG A1C>EQUAL 8.0%<EQUAL 9.0%: CPT | Performed by: INTERNAL MEDICINE

## 2022-09-23 PROCEDURE — 99214 OFFICE O/P EST MOD 30 MIN: CPT | Performed by: INTERNAL MEDICINE

## 2022-09-23 PROCEDURE — 95251 CONT GLUC MNTR ANALYSIS I&R: CPT | Performed by: INTERNAL MEDICINE

## 2022-09-23 RX ORDER — INSULIN PMP CART,AUT,G6/7,CNTR
EACH SUBCUTANEOUS
Qty: 10 EACH | Refills: 3 | Status: SHIPPED | OUTPATIENT
Start: 2022-09-23

## 2022-09-23 RX ORDER — ROSUVASTATIN CALCIUM 10 MG/1
TABLET, COATED ORAL
Qty: 13 TABLET | Refills: 2 | Status: SHIPPED | OUTPATIENT
Start: 2022-09-23

## 2022-09-23 RX ORDER — INSULIN PMP CART,AUT,G6/7,CNTR
EACH SUBCUTANEOUS
Qty: 1 KIT | Refills: 0 | Status: SHIPPED | OUTPATIENT
Start: 2022-09-23

## 2022-09-23 NOTE — PROGRESS NOTES
(2 points)   Echogenicity: Hypoechoic (2 points)   Shape:  Wider than tall (0 points)   Margin: Smooth (0 points)   Echogenic foci: None (0 points)   TIRADS Level: TI-RADS 4   Comparison:  No significant change since the comparison thyroid ultrasound scan   Prior Biopsy:  None   Recommendation for this nodule: US followup at 1, 2, 3, and 5 years          The following portions of the patient's history were reviewed and updated as appropriate:       Family History   Problem Relation Age of Onset    Cancer Mother     High Blood Pressure Mother     Thyroid Disease Mother     Diabetes Father     Heart Disease Father     High Blood Pressure Father     Heart Disease Maternal Grandmother     High Blood Pressure Maternal Grandfather     Stroke Paternal Grandmother     Glaucoma Paternal Grandmother     Diabetes Paternal Aunt           Social History     Socioeconomic History    Marital status:      Spouse name: Not on file    Number of children: Not on file    Years of education: Not on file    Highest education level: Not on file   Occupational History    Not on file   Tobacco Use    Smoking status: Never    Smokeless tobacco: Never   Vaping Use    Vaping Use: Never used   Substance and Sexual Activity    Alcohol use: No    Drug use: No    Sexual activity: Not on file   Other Topics Concern    Not on file   Social History Narrative    Not on file     Social Determinants of Health     Financial Resource Strain: Not on file   Food Insecurity: Not on file   Transportation Needs: Not on file   Physical Activity: Not on file   Stress: Not on file   Social Connections: Not on file   Intimate Partner Violence: Not on file   Housing Stability: Not on file        Past Medical History:   Diagnosis Date    Diabetes mellitus ([address]     Edema     Hyperlipidemia     Insulin pump in place     Long-term insulin use (HCC)     Low back pain     Thyroid nodule     Type 1 diabetes mellitus with manifestations [facility])         Past Surgical History:   Procedure Laterality Date    CARPAL TUNNEL RELEASE       SECTION      X3     SECTION      HAND SURGERY      CARPAL X 4 ON RIGHT AND CARPAL X3 ON LEFT          Allergies   Allergen Reactions    Ace Inhibitors Swelling    Other Anaphylaxis    Penicillins      THROAT CLOSES    Atorvastatin      Other reaction(s): Muscle Aches          Current Outpatient Medications:     Insulin Disposable Pump (OMNIPOD CLASSIC PODS, GEN 3,) MISC, by Does not apply route, Disp: , Rfl:     ergocalciferol (DRISDOL) 1.25 MG (04118 UT) capsule, Take 1 capsule by mouth Twice a Week, Disp: 26 capsule, Rfl: 3    losartan-hydroCHLOROthiazide (HYZAAR) 50-12.5 MG per tablet, TAKE 1 TABLET BY MOUTH DAILY, Disp: 90 tablet, Rfl: 1    Continuous Blood Gluc Sensor (DEXCOM G6 SENSOR) MISC, Change every 10 days. Use for personal CGMS. On Multiple insulin shots, checks blood sugars 4 times per day and requires frequent insulin adjustment. (Patient not taking: Reported on 2022), Disp: 9 each, Rfl: 3    Continuous Blood Gluc Transmit (DEXCOM G6 TRANSMITTER) MISC, Change every 3 months, Disp: 1 each, Rfl: 3    Continuous Blood Gluc  (DEXCOM G6 ) SO, Use for personal CGM (Patient not taking: Reported on 2022), Disp: 1 each, Rfl: 0    insulin lispro (HUMALOG KWIKPEN U-200) 200 UNIT/ML SOPN pen, Use up to 80 units per day, Disp: 12 pen, Rfl: 2    Continuous Blood Gluc  (DEXCOM G6 ) SO, Use for personal CGM (Patient not taking: Reported on 2022), Disp: 1 each, Rfl: 0    Continuous Blood Gluc Sensor (DEXCOM G6 SENSOR) MISC, Change every 10 days. Use for personal CGMS. On Multiple insulin shots, checks blood sugars 4 times per day and requires frequent insulin adjustment. (Patient not taking: Reported on 2022), Disp: 3 each, Rfl: 5       Review of Systems:    Constitutional: Negative for fever, chills, and unexpected weight change.    HENT: Negative for congestion, ear pain, rhinorrhea,  sore throat and trouble swallowing. Eyes: Negative for photophobia, redness, itching. Respiratory: Negative for cough, shortness of breath and sputum. Cardiovascular: Negative for chest pain, palpitations and leg swelling. Gastrointestinal: Negative for nausea, vomiting, abdominal pain, diarrhea, constipation. Endocrine: Negative for cold intolerance, heat intolerance, polydipsia, polyphagia and polyuria. Genitourinary: Negative for dysuria, urgency, frequency, hematuria and flank pain. Musculoskeletal: Negative for myalgias, back pain, arthralgias and neck pain. Skin/Nail: Negative for rash, itching. Normal nails. Neurological: Negative for seizures, weakness, light-headedness, numbness and headaches. Hematological/ Lymph nodes: Negative for adenopathy. Does not bruise/bleed easily. Psychiatric/Behavioral: Negative for suicidal ideas, depression, anxiety, sleep disturbance and decreased concentration. Objective:   Physical Exam:  [facility] 03/06/2021    Constitutional: Patient is oriented to person, place, and time. Patient appears well-developed and well-nourished. HENT:    Head: Normocephalic and atraumatic. Eyes: Conjunctivae and EOM are normal.     Neck: Normal range of motion. Musculoskeletal: Normal range of motion. Neurological: Patient is alert and oriented to person, place, and time. Skin: Skin is warm and dry. Psychiatric: Patient has a normal mood and affect.  Patient behavior is normal.     Lab Review:    Orders Only on 08/27/2022   Component Date Value Ref Range Status    Vit D, 25-Hydroxy 08/27/2022 57.3  >=30 ng/mL Final    Microalbumin, Random Urine 08/27/2022 14.10 (A) <2.0 mg/dL Final    Creatinine, Ur 08/27/2022 142.9  28.0 - 259.0 mg/dL Final    Microalbumin Creatinine Ratio 08/27/2022 98.7 (A) 0.0 - 30.0 mg/g Final    Sodium 08/27/2022 143  136 - 145 mmol/L Final    Potassium 08/27/2022 5.1  3.5 - 5.1 mmol/L Final    Chloride 08/27/2022 104 and send me. She has 20% failures on PODS. DEXCOM personal CGMS data downloaded and reviewed   Average glucose:  183  Blood sugars: Above 180 - 45 % ,  - 52 %, below 70 - 3 %  Blood sugars are higher after breakfast   During the day blood sugars are running well   Some lows after lunch and dinner even without food   Sometimes skipping insulin dose of dinner    No activity related changes in blood sugars   Hypoglycemia: some lows as above   Based on the data, changes are below      C/w Dexcom     Change Omnipod to Dash 5     To lose weight, target Hernan intake less than 1050 a day     Humalog U-200     Basal rates (units per hour)   MN: 0.65  5am: 0.80   Add 11am: 0.65     Insulin to carb ratio   Change MN:  9 > 8  Add 11am: 9   Add 6pm: 11      Correction   MN: 45     Target glucose range:120   Correct above 120   Active insulin time : 4 hours      Max bolus:  20 units      She does not want to take Victoza for weight loss     All instructions provided in written. Check Blood sugars 4 times per day. Log them along with insulin and send them every 2 weeks. Call for blood sugars less than 60 or more than 400. Eye exam: Last exam in April 2021, reports stable background retinopathy .  Due for exam.   Foot exam:  Sep 2022   Deformity/amputation: absent  Skin lesions/pre-ulcerative calluses: absent  Edema: right- negative, left- negative  Sensory exam: Monofilament sensation: normal  Pulses: normal, Vibration (128 Hz): mildly impaired     Renal screen: 229 - Sep 2020, 138 June 2021 > 98 - Aug 2022   TSH screen: normal Sep 2020     2: HTN   Slightly high   ACE inhibitor caused swelling   Losartan-HCTZ 50-12.5 mg daily in am     3: Hyperlipidemia   LDL: 147 , HDL: 50, TGs: 96 - Aug 2022      Lipitor caused severe body aches     Vit D : 57 - Aug 2022    On ergocalciferol 50K twice weekly     She takes MVT and D3 2,000 units daily     Will start low dose crestor 10 mg once a week      4: Multiple thyroid nodules  US June 2021:   Showed multiple thyroid nodules. One was 9mm, TR4. Repeat US May 2022: Unchanged 0.7 cm TI-RADS TR3 nodule in the mid right thyroid lobe. A 1.0 cm TR2 nodule in the upper to mid left thyroid lobe is unchanged in size but has a slightly increased cystic component resulting in a decreased TI-RADS score (previously TR3). No dedicated follow-up is recommended. Will repeat US in 2 years, June 2024        RTC in 3 months with A1C , Vit d, lipids             EDUCATION:   Greater than 50% of this visit was spent in general counseling regarding obesity, diet, exercise, importance of adherence to insulin regime, recognition and treatment of hypo and hyperglycemia,  glucose logging, proper diabetes management, diabetic complications with poor management and the importance of glycemic control in order to avoid the complications of diabetes. Risks and potential complications of diabetes were reviewed with the patient. Diabetes health maintenance plan and follow-up were discussed and understood by the patient. We reviewed the importance of medication compliance and regular follow-up. Aggressive lifestyle modification was encouraged. Exercise Counselling: This patient is (not) a candidate for regular physical exercise. Instructions to perform the following types of exercise:  Swimming or water aerobic exercise  Brisk walking  Playing tennis  Stationary bicycle or elliptical indoor  Low impact aerobic exercise    Instructions given to exercise for the following duration:  30 minutes a day for five-seven days per week.     Following instructions for being active throughout the day in addition to formal exercise:  Walk instead of drive whenever possible  Take the stairs instead of the elevator  Work in the garden  Park to the far end of the parking lot to add more walking steps to destination      Electronically signed by Morena Block MD on 9/23/2022 at 7:55 AM

## 2022-10-04 DIAGNOSIS — E10.21 TYPE 1 DIABETES MELLITUS WITH DIABETIC NEPHROPATHY (HCC): ICD-10-CM

## 2022-10-04 RX ORDER — INSULIN LISPRO 200 [IU]/ML
INJECTION, SOLUTION SUBCUTANEOUS
Qty: 36 ML | Refills: 3 | Status: SHIPPED | OUTPATIENT
Start: 2022-10-04

## 2022-10-04 NOTE — TELEPHONE ENCOUNTER
Requested Refill:   Requested Prescriptions     Pending Prescriptions Disp Refills    insulin lispro (HUMALOG KWIKPEN) 200 UNIT/ML SOPN pen [Pharmacy Med Name: HUMALOG 200 U/ML KWIKPEN INJ 3ML] 36 mL      Sig: ADMINISTER UP TO 80 UNITS UNDER THE SKIN EVERY DAY       Last refilled: 3/10/2022 # 12 pens with 2 refills     Last Appt: 9/23/2022  Next Appt: 12/21/2022

## 2022-11-29 DIAGNOSIS — E10.69 DYSLIPIDEMIA DUE TO TYPE 1 DIABETES MELLITUS (HCC): ICD-10-CM

## 2022-11-29 DIAGNOSIS — I10 ESSENTIAL HYPERTENSION: ICD-10-CM

## 2022-11-29 DIAGNOSIS — E04.2 MULTIPLE THYROID NODULES: ICD-10-CM

## 2022-11-29 DIAGNOSIS — E55.9 VITAMIN D DEFICIENCY: ICD-10-CM

## 2022-11-29 DIAGNOSIS — E10.3299 BACKGROUND DIABETIC RETINOPATHY WITHOUT MACULAR EDEMA ASSOCIATED WITH TYPE 1 DIABETES MELLITUS (HCC): ICD-10-CM

## 2022-11-29 DIAGNOSIS — Z46.81 INSULIN PUMP TITRATION: ICD-10-CM

## 2022-11-29 DIAGNOSIS — E10.21 TYPE 1 DIABETES MELLITUS WITH DIABETIC NEPHROPATHY (HCC): ICD-10-CM

## 2022-11-29 DIAGNOSIS — E78.5 DYSLIPIDEMIA DUE TO TYPE 1 DIABETES MELLITUS (HCC): ICD-10-CM

## 2022-11-29 DIAGNOSIS — Z96.41 INSULIN PUMP STATUS: ICD-10-CM

## 2022-11-29 RX ORDER — LOSARTAN POTASSIUM AND HYDROCHLOROTHIAZIDE 12.5; 5 MG/1; MG/1
1 TABLET ORAL DAILY
Qty: 90 TABLET | Refills: 1 | Status: SHIPPED | OUTPATIENT
Start: 2022-11-29

## 2022-11-29 NOTE — TELEPHONE ENCOUNTER
Requested Refill:   Requested Prescriptions     Pending Prescriptions Disp Refills    losartan-hydroCHLOROthiazide (HYZAAR) 50-12.5 MG per tablet [Pharmacy Med Name: LOSARTAN/HCTZ 50/12.5MG TABLETS] 90 tablet 1     Sig: TAKE 1 TABLET BY MOUTH DAILY     Last refilled: 6/3/2022 # 90 with 1 refill     Last Appt: 9/23/2022  Next Appt: 12/21/2022

## 2022-12-16 DIAGNOSIS — E10.21 TYPE 1 DIABETES MELLITUS WITH DIABETIC NEPHROPATHY (HCC): ICD-10-CM

## 2022-12-16 DIAGNOSIS — E10.69 DYSLIPIDEMIA DUE TO TYPE 1 DIABETES MELLITUS (HCC): ICD-10-CM

## 2022-12-16 DIAGNOSIS — E10.3299 BACKGROUND DIABETIC RETINOPATHY WITHOUT MACULAR EDEMA ASSOCIATED WITH TYPE 1 DIABETES MELLITUS (HCC): ICD-10-CM

## 2022-12-16 DIAGNOSIS — I10 ESSENTIAL HYPERTENSION: ICD-10-CM

## 2022-12-16 DIAGNOSIS — E78.5 DYSLIPIDEMIA DUE TO TYPE 1 DIABETES MELLITUS (HCC): ICD-10-CM

## 2022-12-16 DIAGNOSIS — E55.9 VITAMIN D DEFICIENCY: ICD-10-CM

## 2022-12-16 LAB
CHOLESTEROL, FASTING: 189 MG/DL (ref 0–199)
HDLC SERPL-MCNC: 48 MG/DL (ref 40–60)
LDL CHOLESTEROL CALCULATED: 122 MG/DL
TRIGLYCERIDE, FASTING: 97 MG/DL (ref 0–150)
VITAMIN D 25-HYDROXY: 42.5 NG/ML
VLDLC SERPL CALC-MCNC: 19 MG/DL

## 2022-12-17 LAB
ESTIMATED AVERAGE GLUCOSE: 168.6 MG/DL
HBA1C MFR BLD: 7.5 %

## 2022-12-21 ENCOUNTER — OFFICE VISIT (OUTPATIENT)
Dept: ENDOCRINOLOGY | Age: 57
End: 2022-12-21
Payer: COMMERCIAL

## 2022-12-21 VITALS
BODY MASS INDEX: 40.67 KG/M2 | HEIGHT: 62 IN | OXYGEN SATURATION: 96 % | WEIGHT: 221 LBS | SYSTOLIC BLOOD PRESSURE: 140 MMHG | HEART RATE: 76 BPM | DIASTOLIC BLOOD PRESSURE: 74 MMHG

## 2022-12-21 DIAGNOSIS — E10.21 TYPE 1 DIABETES MELLITUS WITH DIABETIC NEPHROPATHY (HCC): Primary | ICD-10-CM

## 2022-12-21 DIAGNOSIS — I10 ESSENTIAL HYPERTENSION: ICD-10-CM

## 2022-12-21 DIAGNOSIS — E10.3299 BACKGROUND DIABETIC RETINOPATHY WITHOUT MACULAR EDEMA ASSOCIATED WITH TYPE 1 DIABETES MELLITUS (HCC): ICD-10-CM

## 2022-12-21 DIAGNOSIS — E10.69 DYSLIPIDEMIA DUE TO TYPE 1 DIABETES MELLITUS (HCC): ICD-10-CM

## 2022-12-21 DIAGNOSIS — E55.9 VITAMIN D DEFICIENCY: ICD-10-CM

## 2022-12-21 DIAGNOSIS — E78.5 DYSLIPIDEMIA DUE TO TYPE 1 DIABETES MELLITUS (HCC): ICD-10-CM

## 2022-12-21 DIAGNOSIS — Z96.41 INSULIN PUMP STATUS: ICD-10-CM

## 2022-12-21 PROCEDURE — 3075F SYST BP GE 130 - 139MM HG: CPT | Performed by: INTERNAL MEDICINE

## 2022-12-21 PROCEDURE — 3051F HG A1C>EQUAL 7.0%<8.0%: CPT | Performed by: INTERNAL MEDICINE

## 2022-12-21 PROCEDURE — 3078F DIAST BP <80 MM HG: CPT | Performed by: INTERNAL MEDICINE

## 2022-12-21 PROCEDURE — 95251 CONT GLUC MNTR ANALYSIS I&R: CPT | Performed by: INTERNAL MEDICINE

## 2022-12-21 PROCEDURE — 99214 OFFICE O/P EST MOD 30 MIN: CPT | Performed by: INTERNAL MEDICINE

## 2022-12-21 NOTE — PROGRESS NOTES
Patient ID:   Salima Scales is a 56 y.o. female     Chief Complaint:   Salima Scales presents for an  an evaluation of Type 1 Diabetes Mellitus , Hyperlipidemia and hypertension. Subjective:   Type 1 Diabetes Mellitus diagnosed in 8. On insulin since diagnosis. Previously seen by Dr Miles Orellana. She was not happy with the care . Used Humalog U200 for Omnipod     On asking if she counts carbs. She said she tries some times. She said she eats same stuff     Settings changed again     Basal rates (units per hour)   MN: 0.65  5am: 0.80   11am: 0.80     Insulin to carb ratio   MN: 8  11am: 9      Correction   MN: 45     Target glucose range:110   Correct above 160   Active insulin time : 4 hours      Max bolus:  20 units      Using [facility] THE     Checks blood sugars 4-5 times per day. Reviewed/Reported  AM:    Lunch:    Supper:     HS:      Hypoglycemias: Sep 2022: 55 before dinner. Meals: Three, dinner is bigger. Usually no snacks. No sodas. Exercise: Started dance/stretch and strengthening class yesterday. It is twice a week. She will also start doing Howard-chi    Denies chest pain, exertional dyspnea. Family history of CAD: Dad had MI in his 63's   Denies smoking. Counselled for smoking cessation. Denies/Drinks alcohol. Currently on ASA mg daily     Thyroid nodules:   US May 2020   One on right and and one on left   One subcentimeter thyroid nodule in the right thyroid lobe which is smoothly marginated, not taller-than-wide, and with no echogenic foci, deemed to be clinically insignificant, and for which no biopsy or follow up is recommended per ACR TIRADS criteria. Other potentially more significant thyroid nodule is are detailed below. No new or enlarging thyroid nodule is identified. Thyroid Nodule Location: Upper left thyroid lobe   Thyroid Nodule Size:  1 cm   Composition: Solid or almost completely solid (2 points)   Echogenicity: Hypoechoic (2 points)   Shape:  Wider than tall (0 points)   Margin: Smooth (0 points)   Echogenic foci: None (0 points)   TIRADS Level: TI-RADS 4   Comparison:  No significant change since the comparison thyroid ultrasound scan   Prior Biopsy:  None   Recommendation for this nodule: US followup at 1, 2, 3, and 5 years          The following portions of the patient's history were reviewed and updated as appropriate:       Family History   Problem Relation Age of Onset    Cancer Mother     High Blood Pressure Mother     Thyroid Disease Mother     Diabetes Father     Heart Disease Father     High Blood Pressure Father     Heart Disease Maternal Grandmother     High Blood Pressure Maternal Grandfather     Stroke Paternal Grandmother     Glaucoma Paternal Grandmother     Diabetes Paternal Aunt           Social History     Socioeconomic History    Marital status:      Spouse name: Not on file    Number of children: Not on file    Years of education: Not on file    Highest education level: Not on file   Occupational History    Not on file   Tobacco Use    Smoking status: Never    Smokeless tobacco: Never   Vaping Use    Vaping Use: Never used   Substance and Sexual Activity    Alcohol use: No    Drug use: No    Sexual activity: Not on file   Other Topics Concern    Not on file   Social History Narrative    Not on file     Social Determinants of Health     Financial Resource Strain: Not on file   Food Insecurity: Not on file   Transportation Needs: Not on file   Physical Activity: Not on file   Stress: Not on file   Social Connections: Not on file   Intimate Partner Violence: Not on file   Housing Stability: Not on file        Past Medical History:   Diagnosis Date    Diabetes mellitus ([facility]     Edema     Hyperlipidemia     Insulin pump in place     Long-term insulin use (HCC)     Low back pain     Thyroid nodule     Type 1 diabetes mellitus with manifestations ([facility]         Past Surgical History:   Procedure Laterality Date    CARPAL TUNNEL RELEASE       SECTION      X3     SECTION      COLONOSCOPY  2022    HAND SURGERY      CARPAL X 4 ON RIGHT AND CARPAL X3 ON LEFT          Allergies   Allergen Reactions    Ace Inhibitors Swelling    Other Anaphylaxis    Penicillins      THROAT CLOSES    Atorvastatin      Other reaction(s): Muscle Aches          Current Outpatient Medications:     losartan-hydroCHLOROthiazide (HYZAAR) 50-12.5 MG per tablet, TAKE 1 TABLET BY MOUTH DAILY, Disp: 90 tablet, Rfl: 1    insulin lispro (HUMALOG KWIKPEN) 200 UNIT/ML SOPN pen, ADMINISTER UP TO 80 UNITS UNDER THE SKIN EVERY DAY, Disp: 36 mL, Rfl: 3    Multiple Vitamin (MULTIVITAMIN ADULT PO), Take by mouth, Disp: , Rfl:     Insulin Disposable Pump (OMNIPOD 5 G6 POD, GEN 5,) MISC, Change every 3 days, Disp: 10 each, Rfl: 3    Continuous Blood Gluc Sensor (DEXCOM G6 SENSOR) MISC, Change every 10 days. Use for personal CGMS. On Multiple insulin shots, checks blood sugars 4 times per day and requires frequent insulin adjustment. , Disp: 9 each, Rfl: 3    Continuous Blood Gluc Transmit (DEXCOM G6 TRANSMITTER) MISC, Change every 3 months, Disp: 1 each, Rfl: 3    Continuous Blood Gluc  (DEXCOM G6 ) SO, Use for personal CGM, Disp: 1 each, Rfl: 0    rosuvastatin (CRESTOR) 10 MG tablet, Once a week (Patient not taking: Reported on 2022), Disp: 13 tablet, Rfl: 2    ergocalciferol (DRISDOL) 1.25 MG (49713 UT) capsule, Take 1 capsule by mouth Twice a Week, Disp: 26 capsule, Rfl: 3       Review of Systems:    Constitutional: Negative for fever, chills, and unexpected weight change. HENT: Negative for congestion, ear pain, rhinorrhea,  sore throat and trouble swallowing. Eyes: Negative for photophobia, redness, itching. Respiratory: Negative for cough, shortness of breath and sputum. Cardiovascular: Negative for chest pain, palpitations and leg swelling. Gastrointestinal: Negative for nausea, vomiting, abdominal pain, diarrhea, constipation.    Endocrine: Negative for cold intolerance, heat intolerance, polydipsia, polyphagia and polyuria. Genitourinary: Negative for dysuria, urgency, frequency, hematuria and flank pain. Musculoskeletal: Negative for myalgias, back pain, arthralgias and neck pain. Skin/Nail: Negative for rash, itching. Normal nails. Neurological: Negative for seizures, weakness, light-headedness, numbness and headaches. Hematological/ Lymph nodes: Negative for adenopathy. Does not bruise/bleed easily. Psychiatric/Behavioral: Negative for suicidal ideas, depression, anxiety, sleep disturbance and decreased concentration. Objective:   Physical Exam:  BP (!) 140/74 (Site: Right Upper Arm, Position: Sitting, Cuff Size: Large Adult)   Pulse 76   Ht 5' 2\" (1.575 m)   Wt 221 lb (100.2 kg)   LMP 03/06/2021   SpO2 96%   BMI 40.42 kg/m²    Constitutional: Patient is oriented to person, place, and time. Patient appears well-developed and well-nourished. HENT:    Head: Normocephalic and atraumatic. Eyes: Conjunctivae and EOM are normal.     Neck: Normal range of motion. Musculoskeletal: Normal range of motion. Neurological: Patient is alert and oriented to person, place, and time. Skin: Skin is warm and dry. Psychiatric: Patient has a normal mood and affect.  Patient behavior is normal.     Lab Review:    Orders Only on 12/16/2022   Component Date Value Ref Range Status    Vit D, 25-Hydroxy 12/16/2022 42.5  >=30 ng/mL Final    Cholesterol, Fasting 12/16/2022 189  0 - 199 mg/dL Final    Triglyceride, Fasting 12/16/2022 97  0 - 150 mg/dL Final    HDL 12/16/2022 48  40 - 60 mg/dL Final    LDL Calculated 12/16/2022 122 (A)  <100 mg/dL Final    VLDL Cholesterol Calculated 12/16/2022 19  Not Established mg/dL Final    Hemoglobin A1C 12/16/2022 7.5  See comment % Final    eAG 12/16/2022 168.6  mg/dL Final   Orders Only on 08/27/2022   Component Date Value Ref Range Status    Vit D, 25-Hydroxy 08/27/2022 57.3  >=30 ng/mL Final Microalbumin, Random Urine 08/27/2022 14.10 (A)  <2.0 mg/dL Final    Creatinine, Ur 08/27/2022 142.9  28.0 - 259.0 mg/dL Final    Microalbumin Creatinine Ratio 08/27/2022 98.7 (A)  0.0 - 30.0 mg/g Final    Sodium 08/27/2022 143  136 - 145 mmol/L Final    Potassium 08/27/2022 5.1  3.5 - 5.1 mmol/L Final    Chloride 08/27/2022 104  99 - 110 mmol/L Final    CO2 08/27/2022 28  21 - 32 mmol/L Final    Anion Gap 08/27/2022 11  3 - 16 Final    Glucose 08/27/2022 223 (A)  70 - 99 mg/dL Final    BUN 08/27/2022 15  7 - 20 mg/dL Final    Creatinine 08/27/2022 1.0  0.6 - 1.1 mg/dL Final    GFR Non- 08/27/2022 58 (A)  >60 Final    GFR  08/27/2022 >60  >60 Final    Calcium 08/27/2022 9.5  8.3 - 10.6 mg/dL Final    Total Protein 08/27/2022 6.7  6.4 - 8.2 g/dL Final    Albumin 08/27/2022 4.2  3.4 - 5.0 g/dL Final    Albumin/Globulin Ratio 08/27/2022 1.7  1.1 - 2.2 Final    Total Bilirubin 08/27/2022 0.4  0.0 - 1.0 mg/dL Final    Alkaline Phosphatase 08/27/2022 84  40 - 129 U/L Final    ALT 08/27/2022 15  10 - 40 U/L Final    AST 08/27/2022 14 (A)  15 - 37 U/L Final    Cholesterol, Fasting 08/27/2022 216 (A)  0 - 199 mg/dL Final    Triglyceride, Fasting 08/27/2022 96  0 - 150 mg/dL Final    HDL 08/27/2022 50  40 - 60 mg/dL Final    LDL Calculated 08/27/2022 147 (A)  <100 mg/dL Final    VLDL Cholesterol Calculated 08/27/2022 19  Not Established mg/dL Final    Hemoglobin A1C 08/27/2022 8.3  See comment % Final    eAG 08/27/2022 191.5  mg/dL Final   Orders Only on 03/08/2022   Component Date Value Ref Range Status    Vit D, 25-Hydroxy 03/08/2022 25.6 (A)  >=30 ng/mL Final    Hemoglobin A1C 03/08/2022 8.6  See comment % Final    eAG 03/08/2022 200.1  mg/dL Final           Assessment and Plan     Ileana was seen today for follow-up and diabetes.     Diagnoses and all orders for this visit:    Type 1 diabetes mellitus with diabetic nephropathy [facility])  -     External Referral to Endocrinology  -     Hemoglobin A1C; Future    Essential hypertension  -     External Referral to Endocrinology  -     Hemoglobin A1C; Future    Dyslipidemia due to type 1 diabetes mellitus ([facility]  -     External Referral to Endocrinology  -     Hemoglobin A1C; Future    Background diabetic retinopathy without macular edema associated with type 1 diabetes mellitus ([facility]  -     External Referral to Endocrinology  -     Hemoglobin A1C; Future    Vitamin D deficiency  -     External Referral to Endocrinology  -     Hemoglobin A1C; Future    Insulin pump status  -     External Referral to Endocrinology  -     Hemoglobin A1C; Future         1: Type 1 DM complicated with nephropathy, retinopathy , neuropathy    Controlled A1C 7.5% < 8.3% < 8.6% < 8.6% < 7.7% < 8.7% <  8.6%     A1C of <8 would be acceptable because of microvascular complications. We could not download as Carla Baires was not working. She will download at home and send me. She has 20% failures on PODS. DEXCOM personal CGMS data downloaded and reviewed   Average glucose:  165 < 183  Blood sugars: Above 180 - 43 % ,  - 54 %, below 70 - 3 %  Only 3 days data   Blood sugars are higher after breakfast   During the day blood sugars are running lower after breakfast   Sometimes skipping insulin dose of dinner    No activity related changes in blood sugars   Hypoglycemia: before lunch   Based on the data, changes are below      C/w Dexcom     Finishing pods, has supply for dash     To lose weight, target Hernan intake less than 1050 a day     Humalog U-200   Basal rates (units per hour)   MN: 0.65  5am: 0.80   11am: 0.80     Insulin to carb ratio   MN: 8  11am: 9      Correction   MN: 45     Target glucose range:110   Correct above 160   Active insulin time : 4 hours      Max bolus:  20 units      Using DEXCOM   She does not want to take Victoza for weight loss     All instructions provided in written. Check Blood sugars 4 times per day.  Log them along with insulin and send them every 2 weeks. Call for blood sugars less than 60 or more than 400. Eye exam: Last exam in April 2021, reports stable background retinopathy . Due for exam.   Foot exam:  Sep 2022   Deformity/amputation: absent  Skin lesions/pre-ulcerative calluses: absent  Edema: right- negative, left- negative  Sensory exam: Monofilament sensation: normal  Pulses: normal, Vibration (128 Hz): mildly impaired     Renal screen: 229 - Sep 2020, 138 June 2021 > 98 - Aug 2022   TSH screen: normal Sep 2020     2: HTN   Slightly high   ACE inhibitor caused swelling   Losartan-HCTZ 50-12.5 mg daily in am     3: Hyperlipidemia   LDL: 122 < 147 , HDL: 48, TGs: 97 - Dec 2022      Lipitor caused severe body aches     Vit D : 42 - Dec 2022    On ergocalciferol 50K twice weekly     She takes MVT and D3 2,000 units daily     LDL improved with vegan diet   She did not start crestor 10 mg once a week      4: Multiple thyroid nodules  US June 2021:   Showed multiple thyroid nodules. One was 9mm, TR4. Repeat US May 2022: Unchanged 0.7 cm TI-RADS TR3 nodule in the mid right thyroid lobe. A 1.0 cm TR2 nodule in the upper to mid left thyroid lobe is unchanged in size but has a slightly increased cystic component resulting in a decreased TI-RADS score (previously TR3). No dedicated follow-up is recommended. Will repeat US in 2 years, June 2024        RTC in 3 months with A1C      [employer] may not cover her. She may go to [facility]:   Greater than 50% of this visit was spent in general counseling regarding obesity, diet, exercise, importance of adherence to insulin regime, recognition and treatment of hypo and hyperglycemia,  glucose logging, proper diabetes management, diabetic complications with poor management and the importance of glycemic control in order to avoid the complications of diabetes. Risks and potential complications of diabetes were reviewed with the patient.   Diabetes health maintenance plan and follow-up were discussed and understood by the patient. We reviewed the importance of medication compliance and regular follow-up. Aggressive lifestyle modification was encouraged. Exercise Counselling: This patient is (not) a candidate for regular physical exercise. Instructions to perform the following types of exercise:  Swimming or water aerobic exercise  Brisk walking  Playing tennis  Stationary bicycle or elliptical indoor  Low impact aerobic exercise    Instructions given to exercise for the following duration:  30 minutes a day for five-seven days per week.     Following instructions for being active throughout the day in addition to formal exercise:  Walk instead of drive whenever possible  Take the stairs instead of the elevator  Work in the garden  Park to the far end of the parking lot to add more walking steps to destination      Electronically signed by Warden Echo MD on 12/21/2022 at 8:18 AM

## 2023-03-11 DIAGNOSIS — Z46.81 INSULIN PUMP TITRATION: ICD-10-CM

## 2023-03-11 DIAGNOSIS — E78.5 DYSLIPIDEMIA DUE TO TYPE 1 DIABETES MELLITUS (HCC): ICD-10-CM

## 2023-03-11 DIAGNOSIS — E10.21 TYPE 1 DIABETES MELLITUS WITH DIABETIC NEPHROPATHY (HCC): ICD-10-CM

## 2023-03-11 DIAGNOSIS — I10 ESSENTIAL HYPERTENSION: ICD-10-CM

## 2023-03-11 DIAGNOSIS — Z96.41 INSULIN PUMP STATUS: ICD-10-CM

## 2023-03-11 DIAGNOSIS — E10.69 DYSLIPIDEMIA DUE TO TYPE 1 DIABETES MELLITUS (HCC): ICD-10-CM

## 2023-03-11 DIAGNOSIS — E04.2 MULTIPLE THYROID NODULES: ICD-10-CM

## 2023-03-11 DIAGNOSIS — E55.9 VITAMIN D DEFICIENCY: ICD-10-CM

## 2023-03-11 DIAGNOSIS — E10.3299 BACKGROUND DIABETIC RETINOPATHY WITHOUT MACULAR EDEMA ASSOCIATED WITH TYPE 1 DIABETES MELLITUS (HCC): ICD-10-CM

## 2023-03-13 RX ORDER — LOSARTAN POTASSIUM AND HYDROCHLOROTHIAZIDE 12.5; 5 MG/1; MG/1
1 TABLET ORAL DAILY
Qty: 90 TABLET | Refills: 0 | Status: SHIPPED | OUTPATIENT
Start: 2023-03-13

## 2023-03-13 NOTE — TELEPHONE ENCOUNTER
Requested Refill:   Requested Prescriptions     Pending Prescriptions Disp Refills    losartan-hydroCHLOROthiazide (HYZAAR) 50-12.5 MG per tablet [Pharmacy Med Name: LOSARTAN/HCTZ 50/12.5MG TABLETS] 90 tablet 1     Sig: TAKE 1 TABLET BY MOUTH DAILY       Last refilled: 11/29/2022 # 90 with 1 refill     Last Appt: 12/21/2022   Next Appt: 5/3/2023

## 2023-04-26 ENCOUNTER — PATIENT MESSAGE (OUTPATIENT)
Dept: ENDOCRINOLOGY | Age: 58
End: 2023-04-26

## 2023-04-26 DIAGNOSIS — E10.21 TYPE 1 DIABETES MELLITUS WITH DIABETIC NEPHROPATHY (HCC): ICD-10-CM

## 2023-04-26 DIAGNOSIS — I10 ESSENTIAL HYPERTENSION: ICD-10-CM

## 2023-04-26 DIAGNOSIS — E10.3299 BACKGROUND DIABETIC RETINOPATHY WITHOUT MACULAR EDEMA ASSOCIATED WITH TYPE 1 DIABETES MELLITUS (HCC): Primary | ICD-10-CM

## 2023-04-26 DIAGNOSIS — E78.5 DYSLIPIDEMIA DUE TO TYPE 1 DIABETES MELLITUS (HCC): ICD-10-CM

## 2023-04-26 DIAGNOSIS — E10.69 DYSLIPIDEMIA DUE TO TYPE 1 DIABETES MELLITUS (HCC): ICD-10-CM

## 2023-04-26 NOTE — TELEPHONE ENCOUNTER
From: Mark Sheppard  To: Dr. Lorenz Hamper: 4/26/2023 4:00 PM EDT  Subject: Lab order    Karsonranjit Rodriguez,   Do you mind please writing an order for my labs. My insurance will not cover [facility] anymore, so [facility] for them to cover it. Ill be paying out of pocket for my office visit. It is hard to find an endocrinologist that listens, so Ill just deal with the cost. The insurance is switching in January and I think the new one covers [facility]. Also- do you mind please checking my thyroid levels including T3 &T4? My mom had Graves disease and I am showing the same symptoms.   Thanks,    Linda Rodríguez

## 2023-05-03 ENCOUNTER — OFFICE VISIT (OUTPATIENT)
Dept: ENDOCRINOLOGY | Age: 58
End: 2023-05-03
Payer: COMMERCIAL

## 2023-05-03 VITALS
WEIGHT: 221.6 LBS | OXYGEN SATURATION: 97 % | BODY MASS INDEX: 40.78 KG/M2 | DIASTOLIC BLOOD PRESSURE: 72 MMHG | HEIGHT: 62 IN | SYSTOLIC BLOOD PRESSURE: 134 MMHG | HEART RATE: 84 BPM

## 2023-05-03 DIAGNOSIS — E10.3299 BACKGROUND DIABETIC RETINOPATHY WITHOUT MACULAR EDEMA ASSOCIATED WITH TYPE 1 DIABETES MELLITUS (HCC): ICD-10-CM

## 2023-05-03 DIAGNOSIS — Z96.41 INSULIN PUMP STATUS: ICD-10-CM

## 2023-05-03 DIAGNOSIS — I10 ESSENTIAL HYPERTENSION: ICD-10-CM

## 2023-05-03 DIAGNOSIS — Z46.81 INSULIN PUMP TITRATION: ICD-10-CM

## 2023-05-03 DIAGNOSIS — E10.69 DYSLIPIDEMIA DUE TO TYPE 1 DIABETES MELLITUS (HCC): ICD-10-CM

## 2023-05-03 DIAGNOSIS — E55.9 VITAMIN D DEFICIENCY: ICD-10-CM

## 2023-05-03 DIAGNOSIS — E78.5 DYSLIPIDEMIA DUE TO TYPE 1 DIABETES MELLITUS (HCC): ICD-10-CM

## 2023-05-03 DIAGNOSIS — E04.2 MULTIPLE THYROID NODULES: ICD-10-CM

## 2023-05-03 DIAGNOSIS — E10.21 TYPE 1 DIABETES MELLITUS WITH DIABETIC NEPHROPATHY (HCC): ICD-10-CM

## 2023-05-03 PROCEDURE — 3075F SYST BP GE 130 - 139MM HG: CPT | Performed by: INTERNAL MEDICINE

## 2023-05-03 PROCEDURE — 99212 OFFICE O/P EST SF 10 MIN: CPT | Performed by: INTERNAL MEDICINE

## 2023-05-03 PROCEDURE — 95251 CONT GLUC MNTR ANALYSIS I&R: CPT | Performed by: INTERNAL MEDICINE

## 2023-05-03 PROCEDURE — 3078F DIAST BP <80 MM HG: CPT | Performed by: INTERNAL MEDICINE

## 2023-05-03 RX ORDER — PROCHLORPERAZINE 25 MG/1
SUPPOSITORY RECTAL
Qty: 1 EACH | Refills: 3 | Status: SHIPPED | OUTPATIENT
Start: 2023-05-03

## 2023-05-03 RX ORDER — INSULIN PMP CART,AUT,G6/7,CNTR
EACH SUBCUTANEOUS
Qty: 30 EACH | Refills: 3 | Status: SHIPPED | OUTPATIENT
Start: 2023-05-03

## 2023-05-03 RX ORDER — PROCHLORPERAZINE 25 MG/1
SUPPOSITORY RECTAL
Qty: 9 EACH | Refills: 3 | Status: SHIPPED | OUTPATIENT
Start: 2023-05-03

## 2023-05-03 RX ORDER — LOSARTAN POTASSIUM AND HYDROCHLOROTHIAZIDE 12.5; 5 MG/1; MG/1
1 TABLET ORAL DAILY
Qty: 90 TABLET | Refills: 3 | Status: SHIPPED | OUTPATIENT
Start: 2023-05-03

## 2023-05-03 NOTE — PROGRESS NOTES
Patient ID:   Dori Pulliam is a 56 y.o. female     Chief Complaint:   Dori Pulliam presents for an  an evaluation of Type 1 Diabetes Mellitus , Hyperlipidemia and hypertension. Subjective:   Type 1 Diabetes Mellitus diagnosed in 8. On insulin since diagnosis. Previously seen by Dr Miles Salmon. She was not happy with the care . Used Humalog U200 for Omnipod     On asking if she counts carbs. She said she tries some times. She said she eats same stuff     Settings changed again   Omnipod 5   Using Humalog U 200     Basal rates (units per hour)   MN: 0.65  5am: 0.80   11am: 0.80     Insulin to carb ratio   MN: 8  11am: 9      Correction   MN: 45     Target glucose range:110   Correct above 160   Active insulin time : 4 hours      Max bolus:  20 units      Using [facility]     Checks blood sugars 4-5 times per day. Reviewed/Reported  AM:    Lunch:    Supper:     HS:      Hypoglycemias: Sep 2022: 55 before dinner. Meals: Three, dinner is bigger. Usually no snacks. No sodas. Exercise: Started dance/stretch and strengthening class yesterday. It is twice a week. She will also start doing Howard-chi    Denies chest pain, exertional dyspnea. Family history of CAD: Dad had MI in his 63's   Denies smoking. Counselled for smoking cessation. Denies/Drinks alcohol. Currently on ASA mg daily     Thyroid nodules:   US May 2020   One on right and and one on left   One subcentimeter thyroid nodule in the right thyroid lobe which is smoothly marginated, not taller-than-wide, and with no echogenic foci, deemed to be clinically insignificant, and for which no biopsy or follow up is recommended per ACR TIRADS criteria. Other potentially more significant thyroid nodule is are detailed below. No new or enlarging thyroid nodule is identified.      Thyroid Nodule Location: Upper left thyroid lobe   Thyroid Nodule Size:  1 cm   Composition: Solid or almost completely solid (2 points)   Echogenicity: Hypoechoic (2

## 2023-05-04 ENCOUNTER — PATIENT MESSAGE (OUTPATIENT)
Dept: ENDOCRINOLOGY | Age: 58
End: 2023-05-04

## 2023-05-05 NOTE — TELEPHONE ENCOUNTER
From: Kevyn Tesfaye  To: Dr. Iraida Montoya: 5/4/2023 6:36 PM EDT  Subject: Isael Iverson still has my pod Rx at 30 days supply instead of 90 days. It costs more for me to get 30 days. Im not sure if they made a mistake. Could you please make sure it is 90 days?     Thank you,    Vani Santiago

## 2023-06-16 DIAGNOSIS — E55.9 VITAMIN D DEFICIENCY: ICD-10-CM

## 2023-06-16 RX ORDER — ERGOCALCIFEROL 1.25 MG/1
CAPSULE ORAL
Qty: 26 CAPSULE | Refills: 0 | Status: SHIPPED | OUTPATIENT
Start: 2023-06-16 | End: 2023-06-26 | Stop reason: SDUPTHER

## 2023-06-26 DIAGNOSIS — E55.9 VITAMIN D DEFICIENCY: ICD-10-CM

## 2023-06-26 RX ORDER — ERGOCALCIFEROL 1.25 MG/1
CAPSULE ORAL
Qty: 24 CAPSULE | Refills: 1 | Status: SHIPPED | OUTPATIENT
Start: 2023-06-26

## 2023-09-08 ENCOUNTER — TELEPHONE (OUTPATIENT)
Dept: ENDOCRINOLOGY | Age: 58
End: 2023-09-08

## 2023-09-08 DIAGNOSIS — E10.69 DYSLIPIDEMIA DUE TO TYPE 1 DIABETES MELLITUS (HCC): ICD-10-CM

## 2023-09-08 DIAGNOSIS — E04.2 MULTIPLE THYROID NODULES: ICD-10-CM

## 2023-09-08 DIAGNOSIS — I10 ESSENTIAL HYPERTENSION: ICD-10-CM

## 2023-09-08 DIAGNOSIS — E55.9 VITAMIN D DEFICIENCY: ICD-10-CM

## 2023-09-08 DIAGNOSIS — E10.21 TYPE 1 DIABETES MELLITUS WITH DIABETIC NEPHROPATHY (HCC): ICD-10-CM

## 2023-09-08 DIAGNOSIS — Z46.81 INSULIN PUMP TITRATION: ICD-10-CM

## 2023-09-08 DIAGNOSIS — E10.3299 BACKGROUND DIABETIC RETINOPATHY WITHOUT MACULAR EDEMA ASSOCIATED WITH TYPE 1 DIABETES MELLITUS (HCC): ICD-10-CM

## 2023-09-08 DIAGNOSIS — E78.5 DYSLIPIDEMIA DUE TO TYPE 1 DIABETES MELLITUS (HCC): ICD-10-CM

## 2023-09-08 DIAGNOSIS — Z96.41 INSULIN PUMP STATUS: ICD-10-CM

## 2023-09-08 RX ORDER — PROCHLORPERAZINE 25 MG/1
SUPPOSITORY RECTAL
Qty: 1 EACH | Refills: 1 | Status: SHIPPED | OUTPATIENT
Start: 2023-09-08

## 2023-09-08 RX ORDER — PROCHLORPERAZINE 25 MG/1
SUPPOSITORY RECTAL
Qty: 9 EACH | Refills: 3 | OUTPATIENT
Start: 2023-09-08

## 2023-09-08 RX ORDER — PROCHLORPERAZINE 25 MG/1
SUPPOSITORY RECTAL
Qty: 9 EACH | Refills: 1 | Status: SHIPPED | OUTPATIENT
Start: 2023-09-08

## 2023-09-08 NOTE — TELEPHONE ENCOUNTER
Submitted PA for [employer]  Via CMM Key: [id number]  STATUS: Approved today  CaseId:[id number];Status:Approved; Review Type:Prior Auth; Coverage Start Date:08/09/2023; Coverage End Date:09/07/2024

## 2023-09-08 NOTE — TELEPHONE ENCOUNTER
Submitted PA for Dexcom Sensor  Via [employer] Key: [id number] STATUS: PENDING. Follow up done daily; if no response in three days we will refax for status check. If another three days goes by with no response we will call the insurance for status.

## 2023-10-04 DIAGNOSIS — E10.69 DYSLIPIDEMIA DUE TO TYPE 1 DIABETES MELLITUS (HCC): ICD-10-CM

## 2023-10-04 DIAGNOSIS — Z46.81 INSULIN PUMP TITRATION: ICD-10-CM

## 2023-10-04 DIAGNOSIS — E04.2 MULTIPLE THYROID NODULES: ICD-10-CM

## 2023-10-04 DIAGNOSIS — E78.5 DYSLIPIDEMIA DUE TO TYPE 1 DIABETES MELLITUS (HCC): ICD-10-CM

## 2023-10-04 DIAGNOSIS — I10 ESSENTIAL HYPERTENSION: ICD-10-CM

## 2023-10-04 DIAGNOSIS — E10.21 TYPE 1 DIABETES MELLITUS WITH DIABETIC NEPHROPATHY (HCC): ICD-10-CM

## 2023-10-04 DIAGNOSIS — Z96.41 INSULIN PUMP STATUS: ICD-10-CM

## 2023-10-04 DIAGNOSIS — E55.9 VITAMIN D DEFICIENCY: ICD-10-CM

## 2023-10-04 DIAGNOSIS — E10.3299 BACKGROUND DIABETIC RETINOPATHY WITHOUT MACULAR EDEMA ASSOCIATED WITH TYPE 1 DIABETES MELLITUS (HCC): ICD-10-CM

## 2023-10-04 LAB
CREAT UR-MCNC: 74.2 MG/DL (ref 28–259)
MICROALBUMIN UR DL<=1MG/L-MCNC: 4.7 MG/DL
MICROALBUMIN/CREAT UR: 63.3 MG/G (ref 0–30)

## 2023-10-05 ENCOUNTER — OFFICE VISIT (OUTPATIENT)
Dept: ENDOCRINOLOGY | Age: 58
End: 2023-10-05
Payer: COMMERCIAL

## 2023-10-05 VITALS
BODY MASS INDEX: 42.14 KG/M2 | OXYGEN SATURATION: 96 % | DIASTOLIC BLOOD PRESSURE: 78 MMHG | WEIGHT: 229 LBS | HEART RATE: 87 BPM | HEIGHT: 62 IN | SYSTOLIC BLOOD PRESSURE: 183 MMHG

## 2023-10-05 DIAGNOSIS — I10 ESSENTIAL HYPERTENSION: ICD-10-CM

## 2023-10-05 DIAGNOSIS — E10.21 TYPE 1 DIABETES MELLITUS WITH DIABETIC NEPHROPATHY (HCC): ICD-10-CM

## 2023-10-05 DIAGNOSIS — E78.5 DYSLIPIDEMIA DUE TO TYPE 1 DIABETES MELLITUS (HCC): ICD-10-CM

## 2023-10-05 DIAGNOSIS — E10.3299 BACKGROUND DIABETIC RETINOPATHY WITHOUT MACULAR EDEMA ASSOCIATED WITH TYPE 1 DIABETES MELLITUS (HCC): ICD-10-CM

## 2023-10-05 DIAGNOSIS — E55.9 VITAMIN D DEFICIENCY: Primary | ICD-10-CM

## 2023-10-05 DIAGNOSIS — E10.69 DYSLIPIDEMIA DUE TO TYPE 1 DIABETES MELLITUS (HCC): ICD-10-CM

## 2023-10-05 LAB
EST. AVERAGE GLUCOSE BLD GHB EST-MCNC: 162.8 MG/DL
HBA1C MFR BLD: 7.3 %
T3FREE SERPL-MCNC: 2.9 PG/ML (ref 2.3–4.2)
T4 FREE SERPL-MCNC: 1.3 NG/DL (ref 0.9–1.8)
TSH SERPL DL<=0.005 MIU/L-ACNC: 2.27 UIU/ML (ref 0.27–4.2)

## 2023-10-05 PROCEDURE — 99214 OFFICE O/P EST MOD 30 MIN: CPT | Performed by: INTERNAL MEDICINE

## 2023-10-05 PROCEDURE — 3078F DIAST BP <80 MM HG: CPT | Performed by: INTERNAL MEDICINE

## 2023-10-05 PROCEDURE — 3077F SYST BP >= 140 MM HG: CPT | Performed by: INTERNAL MEDICINE

## 2023-10-05 PROCEDURE — 95251 CONT GLUC MNTR ANALYSIS I&R: CPT | Performed by: INTERNAL MEDICINE

## 2023-10-05 NOTE — PROGRESS NOTES
polyuria. Genitourinary: Negative for dysuria, urgency, frequency, hematuria and flank pain. Musculoskeletal: Negative for myalgias, back pain, arthralgias and neck pain. Skin/Nail: Negative for rash, itching. Normal nails. Neurological: Negative for seizures, weakness, light-headedness, numbness and headaches. Hematological/ Lymph nodes: Negative for adenopathy. Does not bruise/bleed easily. Psychiatric/Behavioral: Negative for suicidal ideas, depression, anxiety, sleep disturbance and decreased concentration. Objective:   Physical Exam:  [facility] 11/10/2020    Constitutional: Patient is oriented to person, place, and time. Patient appears well-developed and well-nourished. HENT:    Head: Normocephalic and atraumatic. Eyes: Conjunctivae and EOM are normal.     Neck: Normal range of motion. Musculoskeletal: Normal range of motion. Neurological: Patient is alert and oriented to person, place, and time. Skin: Skin is warm and dry. Psychiatric: Patient has a normal mood and affect.  Patient behavior is normal.     Lab Review:    Orders Only on 10/04/2023   Component Date Value Ref Range Status    Microalbumin, Random Urine 10/04/2023 4.70 (H)  <2.0 mg/dL Final    Creatinine, Ur 10/04/2023 74.2  28.0 - 259.0 mg/dL Final    Microalbumin Creatinine Ratio 10/04/2023 63.3 (H)  0.0 - 30.0 mg/g Final    TSH 10/04/2023 2.27  0.27 - 4.20 uIU/mL Final    T4 Free 10/04/2023 1.3  0.9 - 1.8 ng/dL Final    T3, Free 10/04/2023 2.9  2.3 - 4.2 pg/mL Final   Office Visit on 05/03/2023   Component Date Value Ref Range Status    Diabetic Retinopathy 05/05/2021 Positive   Final   Orders Only on 12/16/2022   Component Date Value Ref Range Status    Vit D, 25-Hydroxy 12/16/2022 42.5  >=30 ng/mL Final    Cholesterol, Fasting 12/16/2022 189  0 - 199 mg/dL Final    Triglyceride, Fasting 12/16/2022 97  0 - 150 mg/dL Final    HDL 12/16/2022 48  40 - 60 mg/dL Final    LDL Calculated 12/16/2022 122 (H)  <100 mg/dL

## 2023-11-13 DIAGNOSIS — E10.21 TYPE 1 DIABETES MELLITUS WITH DIABETIC NEPHROPATHY (HCC): ICD-10-CM

## 2023-11-14 RX ORDER — INSULIN LISPRO 200 [IU]/ML
INJECTION, SOLUTION SUBCUTANEOUS
Qty: 36 ML | Refills: 0 | Status: SHIPPED | OUTPATIENT
Start: 2023-11-14

## 2023-11-14 NOTE — TELEPHONE ENCOUNTER
Requested Refill:   Requested Prescriptions     Pending Prescriptions Disp Refills    insulin lispro (HUMALOG KWIKPEN) 200 UNIT/ML SOPN pen 36 mL 0     Sig: ADMINISTER UP  UNITS UNDER THE SKIN EVERY DAY       Last refilled: 6/12/2023 # 36 ml with no refills     Last Appt: 10/5/2023  Next Appt: 1/11/2024

## 2024-01-09 DIAGNOSIS — E10.69 DYSLIPIDEMIA DUE TO TYPE 1 DIABETES MELLITUS (HCC): ICD-10-CM

## 2024-01-09 DIAGNOSIS — E10.21 TYPE 1 DIABETES MELLITUS WITH DIABETIC NEPHROPATHY (HCC): ICD-10-CM

## 2024-01-09 DIAGNOSIS — E55.9 VITAMIN D DEFICIENCY: ICD-10-CM

## 2024-01-09 DIAGNOSIS — E10.3299 BACKGROUND DIABETIC RETINOPATHY WITHOUT MACULAR EDEMA ASSOCIATED WITH TYPE 1 DIABETES MELLITUS (HCC): ICD-10-CM

## 2024-01-09 DIAGNOSIS — E78.5 DYSLIPIDEMIA DUE TO TYPE 1 DIABETES MELLITUS (HCC): ICD-10-CM

## 2024-01-09 DIAGNOSIS — I10 ESSENTIAL HYPERTENSION: ICD-10-CM

## 2024-01-09 LAB
25(OH)D3 SERPL-MCNC: 59.9 NG/ML
CHOLEST SERPL-MCNC: 223 MG/DL (ref 0–199)
HDLC SERPL-MCNC: 53 MG/DL (ref 40–60)
LDL CHOLESTEROL CALCULATED: 150 MG/DL
TRIGL SERPL-MCNC: 99 MG/DL (ref 0–150)
VLDLC SERPL CALC-MCNC: 20 MG/DL

## 2024-01-10 ENCOUNTER — TELEPHONE (OUTPATIENT)
Dept: ENDOCRINOLOGY | Age: 59
End: 2024-01-10

## 2024-01-10 NOTE — TELEPHONE ENCOUNTER
New RX fax form completed for Omnipod-5 G 6 pack completed and faxed to [facility].   Complex Repair And Double M Plasty Text: The defect edges were debeveled with a #15 scalpel blade.  The primary defect was closed partially with a complex linear closure.  Given the location of the remaining defect, shape of the defect and the proximity to free margins a double M plasty was deemed most appropriate for complete closure of the defect.  Using a sterile surgical marker, an appropriate advancement flap was drawn incorporating the defect and placing the expected incisions within the relaxed skin tension lines where possible.    The area thus outlined was incised deep to adipose tissue with a #15 scalpel blade.  The skin margins were undermined to an appropriate distance in all directions utilizing iris scissors.

## 2024-01-11 ENCOUNTER — OFFICE VISIT (OUTPATIENT)
Dept: ENDOCRINOLOGY | Age: 59
End: 2024-01-11

## 2024-01-11 VITALS
OXYGEN SATURATION: 94 % | HEART RATE: 87 BPM | SYSTOLIC BLOOD PRESSURE: 144 MMHG | HEIGHT: 62 IN | DIASTOLIC BLOOD PRESSURE: 73 MMHG | BODY MASS INDEX: 41.59 KG/M2 | WEIGHT: 226 LBS

## 2024-01-11 DIAGNOSIS — E10.21 TYPE 1 DIABETES MELLITUS WITH DIABETIC NEPHROPATHY (HCC): Primary | ICD-10-CM

## 2024-01-11 DIAGNOSIS — E55.9 VITAMIN D DEFICIENCY: ICD-10-CM

## 2024-01-11 DIAGNOSIS — Z46.81 INSULIN PUMP TITRATION: ICD-10-CM

## 2024-01-11 DIAGNOSIS — E78.5 DYSLIPIDEMIA DUE TO TYPE 1 DIABETES MELLITUS (HCC): ICD-10-CM

## 2024-01-11 DIAGNOSIS — E10.3299 BACKGROUND DIABETIC RETINOPATHY WITHOUT MACULAR EDEMA ASSOCIATED WITH TYPE 1 DIABETES MELLITUS (HCC): ICD-10-CM

## 2024-01-11 DIAGNOSIS — E10.69 DYSLIPIDEMIA DUE TO TYPE 1 DIABETES MELLITUS (HCC): ICD-10-CM

## 2024-01-11 DIAGNOSIS — Z96.41 INSULIN PUMP STATUS: ICD-10-CM

## 2024-01-11 LAB
EST. AVERAGE GLUCOSE BLD GHB EST-MCNC: 159.9 MG/DL
HBA1C MFR BLD: 7.2 %

## 2024-01-11 NOTE — PROGRESS NOTES
points)   Shape: Wider than tall (0 points)   Margin: Smooth (0 points)   Echogenic foci: None (0 points)   TIRADS Level: TI-RADS 4   Comparison:  No significant change since the comparison thyroid ultrasound scan   Prior Biopsy:  None   Recommendation for this nodule: US followup at 1, 2, 3, and 5 years          The following portions of the patient's history were reviewed and updated as appropriate:       Family History   Problem Relation Age of Onset    Cancer Mother     High Blood Pressure Mother     Thyroid Disease Mother     Diabetes Father     Heart Disease Father     High Blood Pressure Father     Heart Disease Maternal Grandmother     High Blood Pressure Maternal Grandfather     Stroke Paternal Grandmother     Glaucoma Paternal Grandmother     Diabetes Paternal Aunt           Social History     Socioeconomic History    Marital status:      Spouse name: Not on file    Number of children: Not on file    Years of education: Not on file    Highest education level: Not on file   Occupational History    Not on file   Tobacco Use    Smoking status: Never    Smokeless tobacco: Never   Vaping Use    Vaping Use: Never used   Substance and Sexual Activity    Alcohol use: No    Drug use: No    Sexual activity: Not on file   Other Topics Concern    Not on file   Social History Narrative    Not on file     Social Determinants of Health     Financial Resource Strain: Not on file   Food Insecurity: Not on file   Transportation Needs: Not on file   Physical Activity: Not on file   Stress: Not on file   Social Connections: Not on file   Intimate Partner Violence: Not on file   Housing Stability: Not on file        Past Medical History:   Diagnosis Date    Diabetes mellitus (HCC)     Edema     Hyperlipidemia     Insulin pump in place     Long-term insulin use (HCC)     Low back pain     Thyroid nodule     Type 1 diabetes mellitus with manifestations (HCC)         Past Surgical History:   Procedure Laterality Date

## 2024-01-19 DIAGNOSIS — E10.3299 BACKGROUND DIABETIC RETINOPATHY WITHOUT MACULAR EDEMA ASSOCIATED WITH TYPE 1 DIABETES MELLITUS (HCC): ICD-10-CM

## 2024-01-19 DIAGNOSIS — E10.21 TYPE 1 DIABETES MELLITUS WITH DIABETIC NEPHROPATHY (HCC): ICD-10-CM

## 2024-01-19 DIAGNOSIS — E04.2 MULTIPLE THYROID NODULES: ICD-10-CM

## 2024-01-19 DIAGNOSIS — Z46.81 INSULIN PUMP TITRATION: ICD-10-CM

## 2024-01-19 DIAGNOSIS — E55.9 VITAMIN D DEFICIENCY: ICD-10-CM

## 2024-01-19 DIAGNOSIS — I10 ESSENTIAL HYPERTENSION: ICD-10-CM

## 2024-01-19 DIAGNOSIS — Z96.41 INSULIN PUMP STATUS: ICD-10-CM

## 2024-01-19 DIAGNOSIS — E10.69 DYSLIPIDEMIA DUE TO TYPE 1 DIABETES MELLITUS (HCC): ICD-10-CM

## 2024-01-19 DIAGNOSIS — E78.5 DYSLIPIDEMIA DUE TO TYPE 1 DIABETES MELLITUS (HCC): ICD-10-CM

## 2024-01-19 RX ORDER — INSULIN LISPRO 200 [IU]/ML
INJECTION, SOLUTION SUBCUTANEOUS
Qty: 45 ML | Refills: 1 | Status: SHIPPED | OUTPATIENT
Start: 2024-01-19

## 2024-01-19 RX ORDER — LOSARTAN POTASSIUM AND HYDROCHLOROTHIAZIDE 12.5; 5 MG/1; MG/1
1 TABLET ORAL DAILY
Qty: 90 TABLET | Refills: 3 | Status: SHIPPED | OUTPATIENT
Start: 2024-01-19

## 2024-01-19 RX ORDER — PROCHLORPERAZINE 25 MG/1
SUPPOSITORY RECTAL
Qty: 9 EACH | Refills: 1 | Status: SHIPPED | OUTPATIENT
Start: 2024-01-19

## 2024-02-07 DIAGNOSIS — E55.9 VITAMIN D DEFICIENCY: ICD-10-CM

## 2024-02-07 RX ORDER — ERGOCALCIFEROL 1.25 MG/1
CAPSULE ORAL
Qty: 24 CAPSULE | Refills: 3 | Status: SHIPPED | OUTPATIENT
Start: 2024-02-07 | End: 2024-02-08

## 2024-02-08 DIAGNOSIS — E55.9 VITAMIN D DEFICIENCY: ICD-10-CM

## 2024-02-08 RX ORDER — ERGOCALCIFEROL 1.25 MG/1
CAPSULE ORAL
Qty: 26 CAPSULE | Refills: 2 | Status: SHIPPED | OUTPATIENT
Start: 2024-02-08

## 2024-02-09 DIAGNOSIS — E10.21 TYPE 1 DIABETES MELLITUS WITH DIABETIC NEPHROPATHY (HCC): ICD-10-CM

## 2024-02-09 RX ORDER — INSULIN LISPRO 200 [IU]/ML
INJECTION, SOLUTION SUBCUTANEOUS
Qty: 45 ML | Refills: 1 | Status: SHIPPED | OUTPATIENT
Start: 2024-02-09

## 2024-04-16 DIAGNOSIS — E78.5 DYSLIPIDEMIA DUE TO TYPE 1 DIABETES MELLITUS (HCC): ICD-10-CM

## 2024-04-16 DIAGNOSIS — Z96.41 INSULIN PUMP STATUS: ICD-10-CM

## 2024-04-16 DIAGNOSIS — E55.9 VITAMIN D DEFICIENCY: ICD-10-CM

## 2024-04-16 DIAGNOSIS — E10.3299 BACKGROUND DIABETIC RETINOPATHY WITHOUT MACULAR EDEMA ASSOCIATED WITH TYPE 1 DIABETES MELLITUS (HCC): ICD-10-CM

## 2024-04-16 DIAGNOSIS — Z46.81 INSULIN PUMP TITRATION: ICD-10-CM

## 2024-04-16 DIAGNOSIS — E10.21 TYPE 1 DIABETES MELLITUS WITH DIABETIC NEPHROPATHY (HCC): ICD-10-CM

## 2024-04-16 DIAGNOSIS — E10.69 DYSLIPIDEMIA DUE TO TYPE 1 DIABETES MELLITUS (HCC): ICD-10-CM

## 2024-04-17 ENCOUNTER — OFFICE VISIT (OUTPATIENT)
Dept: ENDOCRINOLOGY | Age: 59
End: 2024-04-17
Payer: COMMERCIAL

## 2024-04-17 VITALS
DIASTOLIC BLOOD PRESSURE: 67 MMHG | WEIGHT: 204.8 LBS | HEART RATE: 86 BPM | OXYGEN SATURATION: 96 % | HEIGHT: 62 IN | BODY MASS INDEX: 37.69 KG/M2 | SYSTOLIC BLOOD PRESSURE: 129 MMHG

## 2024-04-17 DIAGNOSIS — E10.21 TYPE 1 DIABETES MELLITUS WITH DIABETIC NEPHROPATHY (HCC): Primary | ICD-10-CM

## 2024-04-17 DIAGNOSIS — E10.69 DYSLIPIDEMIA DUE TO TYPE 1 DIABETES MELLITUS (HCC): ICD-10-CM

## 2024-04-17 DIAGNOSIS — I10 ESSENTIAL HYPERTENSION: ICD-10-CM

## 2024-04-17 DIAGNOSIS — E78.5 DYSLIPIDEMIA DUE TO TYPE 1 DIABETES MELLITUS (HCC): ICD-10-CM

## 2024-04-17 DIAGNOSIS — Z96.41 INSULIN PUMP STATUS: ICD-10-CM

## 2024-04-17 DIAGNOSIS — E10.3299 BACKGROUND DIABETIC RETINOPATHY WITHOUT MACULAR EDEMA ASSOCIATED WITH TYPE 1 DIABETES MELLITUS (HCC): ICD-10-CM

## 2024-04-17 DIAGNOSIS — E04.2 MULTIPLE THYROID NODULES: ICD-10-CM

## 2024-04-17 DIAGNOSIS — E55.9 VITAMIN D DEFICIENCY: ICD-10-CM

## 2024-04-17 LAB
EST. AVERAGE GLUCOSE BLD GHB EST-MCNC: 154.2 MG/DL
HBA1C MFR BLD: 7 %

## 2024-04-17 PROCEDURE — 3078F DIAST BP <80 MM HG: CPT | Performed by: INTERNAL MEDICINE

## 2024-04-17 PROCEDURE — 95251 CONT GLUC MNTR ANALYSIS I&R: CPT | Performed by: INTERNAL MEDICINE

## 2024-04-17 PROCEDURE — 99214 OFFICE O/P EST MOD 30 MIN: CPT | Performed by: INTERNAL MEDICINE

## 2024-04-17 PROCEDURE — 3051F HG A1C>EQUAL 7.0%<8.0%: CPT | Performed by: INTERNAL MEDICINE

## 2024-04-17 PROCEDURE — 3074F SYST BP LT 130 MM HG: CPT | Performed by: INTERNAL MEDICINE

## 2024-04-17 NOTE — PROGRESS NOTES
with insulin and send them every 2 weeks. Call for blood sugars less than 60 or more than 400.     Eye exam: Last exam in Feb 2024, reports stable background retinopathy . She has glaucoma.   Foot exam:  Oct 2023   Deformity/amputation: absent  Skin lesions/pre-ulcerative calluses: absent  Edema: right- negative, left- negative  Sensory exam: Monofilament sensation: normal  Pulses: normal, Vibration (128 Hz): mildly impaired     Renal screen: 229 > 138 > 98 > 91 > 63   TSH screen: normal Oct 2023     2: HTN   Controlled   ACE inhibitor caused swelling   Losartan-HCTZ 50-12.5 mg daily in am     3: Hyperlipidemia   LDL: 150 < 122 < 147 , HDL: 53, TGs: 99- Jan 2024    Lipitor caused severe body aches     Vit D : 59.9 - Jan 2024     On ergocalciferol 50K twice weekly     She takes MVT and D3 2,000 units daily     LDL better when she is on vegan diet   She did not start crestor 10 mg once a week      4: Multiple thyroid nodules  US June 2021:   Showed multiple thyroid nodules. One was 9mm, TR4.     Repeat US May 2022: Unchanged 0.7 cm TI-RADS TR3 nodule in the mid right thyroid lobe.  A 1.0 cm TR2 nodule in the upper to mid left thyroid lobe is unchanged in size but has a slightly increased cystic component resulting in a decreased TI-RADS score (previously TR3).  No dedicated follow-up is recommended.     Will repeat US in 2 years, June 2024      A1C is pending     RTC in 3 months with A1C , US thyroid         EDUCATION:   Greater than 50% of this visit was spent in general counseling regarding obesity, diet, exercise, importance of adherence to insulin regime, recognition and treatment of hypo and hyperglycemia,  glucose logging, proper diabetes management, diabetic complications with poor management and the importance of glycemic control in order to avoid the complications of diabetes.    Risks and potential complications of diabetes were reviewed with the patient.  Diabetes health maintenance plan and follow-up were

## 2024-05-01 DIAGNOSIS — E04.2 MULTIPLE THYROID NODULES: ICD-10-CM

## 2024-05-01 DIAGNOSIS — E55.9 VITAMIN D DEFICIENCY: ICD-10-CM

## 2024-05-01 DIAGNOSIS — Z96.41 INSULIN PUMP STATUS: ICD-10-CM

## 2024-05-01 DIAGNOSIS — E10.21 TYPE 1 DIABETES MELLITUS WITH DIABETIC NEPHROPATHY (HCC): ICD-10-CM

## 2024-05-01 DIAGNOSIS — E10.3299 BACKGROUND DIABETIC RETINOPATHY WITHOUT MACULAR EDEMA ASSOCIATED WITH TYPE 1 DIABETES MELLITUS (HCC): ICD-10-CM

## 2024-05-01 DIAGNOSIS — I10 ESSENTIAL HYPERTENSION: ICD-10-CM

## 2024-05-01 DIAGNOSIS — Z46.81 INSULIN PUMP TITRATION: ICD-10-CM

## 2024-05-01 DIAGNOSIS — E10.69 DYSLIPIDEMIA DUE TO TYPE 1 DIABETES MELLITUS (HCC): ICD-10-CM

## 2024-05-01 DIAGNOSIS — E78.5 DYSLIPIDEMIA DUE TO TYPE 1 DIABETES MELLITUS (HCC): ICD-10-CM

## 2024-05-02 RX ORDER — INSULIN LISPRO 200 [IU]/ML
INJECTION, SOLUTION SUBCUTANEOUS
Qty: 45 ML | Refills: 1 | Status: SHIPPED | OUTPATIENT
Start: 2024-05-02

## 2024-05-02 RX ORDER — INSULIN PMP CART,AUT,G6/7,CNTR
EACH SUBCUTANEOUS
Qty: 30 EACH | Refills: 3 | OUTPATIENT
Start: 2024-05-02

## 2024-05-02 RX ORDER — PROCHLORPERAZINE 25 MG/1
SUPPOSITORY RECTAL
Qty: 9 EACH | Refills: 1 | Status: SHIPPED | OUTPATIENT
Start: 2024-05-02

## 2024-05-02 RX ORDER — PROCHLORPERAZINE 25 MG/1
SUPPOSITORY RECTAL
Qty: 1 EACH | Refills: 0 | Status: SHIPPED | OUTPATIENT
Start: 2024-05-02

## 2024-05-02 RX ORDER — PROCHLORPERAZINE 25 MG/1
SUPPOSITORY RECTAL
Qty: 1 EACH | Refills: 1 | Status: SHIPPED | OUTPATIENT
Start: 2024-05-02

## 2024-05-02 RX ORDER — INSULIN PMP CART,AUT,G6/7,CNTR
EACH SUBCUTANEOUS
Qty: 30 EACH | Refills: 3 | Status: SHIPPED | OUTPATIENT
Start: 2024-05-02

## 2024-05-13 DIAGNOSIS — E78.5 DYSLIPIDEMIA DUE TO TYPE 1 DIABETES MELLITUS (HCC): ICD-10-CM

## 2024-05-13 DIAGNOSIS — E10.21 TYPE 1 DIABETES MELLITUS WITH DIABETIC NEPHROPATHY (HCC): ICD-10-CM

## 2024-05-13 DIAGNOSIS — Z96.41 INSULIN PUMP STATUS: ICD-10-CM

## 2024-05-13 DIAGNOSIS — Z46.81 INSULIN PUMP TITRATION: ICD-10-CM

## 2024-05-13 DIAGNOSIS — E10.3299 BACKGROUND DIABETIC RETINOPATHY WITHOUT MACULAR EDEMA ASSOCIATED WITH TYPE 1 DIABETES MELLITUS (HCC): ICD-10-CM

## 2024-05-13 DIAGNOSIS — E55.9 VITAMIN D DEFICIENCY: ICD-10-CM

## 2024-05-13 DIAGNOSIS — I10 ESSENTIAL HYPERTENSION: ICD-10-CM

## 2024-05-13 DIAGNOSIS — E04.2 MULTIPLE THYROID NODULES: ICD-10-CM

## 2024-05-13 DIAGNOSIS — E10.69 DYSLIPIDEMIA DUE TO TYPE 1 DIABETES MELLITUS (HCC): ICD-10-CM

## 2024-05-13 RX ORDER — LOSARTAN POTASSIUM AND HYDROCHLOROTHIAZIDE 12.5; 5 MG/1; MG/1
1 TABLET ORAL DAILY
Qty: 90 TABLET | Refills: 3 | OUTPATIENT
Start: 2024-05-13

## 2024-06-26 DIAGNOSIS — E10.69 DYSLIPIDEMIA DUE TO TYPE 1 DIABETES MELLITUS (HCC): ICD-10-CM

## 2024-06-26 DIAGNOSIS — E04.2 MULTIPLE THYROID NODULES: ICD-10-CM

## 2024-06-26 DIAGNOSIS — Z46.81 INSULIN PUMP TITRATION: ICD-10-CM

## 2024-06-26 DIAGNOSIS — E55.9 VITAMIN D DEFICIENCY: ICD-10-CM

## 2024-06-26 DIAGNOSIS — Z96.41 INSULIN PUMP STATUS: ICD-10-CM

## 2024-06-26 DIAGNOSIS — E10.3299 BACKGROUND DIABETIC RETINOPATHY WITHOUT MACULAR EDEMA ASSOCIATED WITH TYPE 1 DIABETES MELLITUS (HCC): ICD-10-CM

## 2024-06-26 DIAGNOSIS — E10.21 TYPE 1 DIABETES MELLITUS WITH DIABETIC NEPHROPATHY (HCC): ICD-10-CM

## 2024-06-26 DIAGNOSIS — E78.5 DYSLIPIDEMIA DUE TO TYPE 1 DIABETES MELLITUS (HCC): ICD-10-CM

## 2024-06-26 DIAGNOSIS — I10 ESSENTIAL HYPERTENSION: ICD-10-CM

## 2024-06-26 RX ORDER — LOSARTAN POTASSIUM AND HYDROCHLOROTHIAZIDE 12.5; 5 MG/1; MG/1
1 TABLET ORAL DAILY
Qty: 90 TABLET | Refills: 0 | OUTPATIENT
Start: 2024-06-26

## 2024-06-26 NOTE — TELEPHONE ENCOUNTER
Medication:   Requested Prescriptions     Pending Prescriptions Disp Refills    losartan-hydroCHLOROthiazide (HYZAAR) 50-12.5 MG per tablet [Pharmacy Med Name: LOSARTAN/HCTZ 50/12.5MG TABLETS] 90 tablet 0     Sig: TAKE 1 TABLET BY MOUTH DAILY       Last Filled:  1/19/24 for 1 year.    Patient Phone Number: [phone] (home)     Last appt: 4/17/2024   Next appt: 7/17/2024    Last Labs DM:   Lab Results   Component Value Date/Time    LABA1C 7.0 04/16/2024 08:46 AM     Last Lipid:   Lab Results   Component Value Date/Time    HDL 53 01/09/2024 08:26 AM     Last PSA: No results found for: \"PSA\"  Last Thyroid:   Lab Results   Component Value Date/Time    TSH 2.27 10/04/2023 07:59 AM    FT3 2.9 10/04/2023 07:59 AM    T4FREE 1.3 10/04/2023 07:59 AM

## 2024-07-16 DIAGNOSIS — E04.2 MULTIPLE THYROID NODULES: ICD-10-CM

## 2024-07-16 DIAGNOSIS — E10.21 TYPE 1 DIABETES MELLITUS WITH DIABETIC NEPHROPATHY (HCC): ICD-10-CM

## 2024-07-17 ENCOUNTER — OFFICE VISIT (OUTPATIENT)
Dept: ENDOCRINOLOGY | Age: 59
End: 2024-07-17
Payer: COMMERCIAL

## 2024-07-17 ENCOUNTER — HOSPITAL ENCOUNTER (OUTPATIENT)
Dept: ULTRASOUND IMAGING | Age: 59
Discharge: HOME | End: 2024-07-17
Payer: COMMERCIAL

## 2024-07-17 VITALS
DIASTOLIC BLOOD PRESSURE: 84 MMHG | WEIGHT: 197 LBS | HEART RATE: 79 BPM | SYSTOLIC BLOOD PRESSURE: 133 MMHG | BODY MASS INDEX: 36.25 KG/M2 | TEMPERATURE: 98 F | RESPIRATION RATE: 14 BRPM | HEIGHT: 62 IN

## 2024-07-17 DIAGNOSIS — E10.21 TYPE 1 DIABETES MELLITUS WITH DIABETIC NEPHROPATHY (HCC): Primary | ICD-10-CM

## 2024-07-17 DIAGNOSIS — E55.9 VITAMIN D DEFICIENCY: ICD-10-CM

## 2024-07-17 DIAGNOSIS — E04.2 MULTIPLE THYROID NODULES: ICD-10-CM

## 2024-07-17 DIAGNOSIS — Z96.41 INSULIN PUMP STATUS: ICD-10-CM

## 2024-07-17 DIAGNOSIS — I10 ESSENTIAL HYPERTENSION: ICD-10-CM

## 2024-07-17 DIAGNOSIS — E10.69 DYSLIPIDEMIA DUE TO TYPE 1 DIABETES MELLITUS (HCC): ICD-10-CM

## 2024-07-17 DIAGNOSIS — E10.3299 BACKGROUND DIABETIC RETINOPATHY WITHOUT MACULAR EDEMA ASSOCIATED WITH TYPE 1 DIABETES MELLITUS (HCC): ICD-10-CM

## 2024-07-17 DIAGNOSIS — E78.5 DYSLIPIDEMIA DUE TO TYPE 1 DIABETES MELLITUS (HCC): ICD-10-CM

## 2024-07-17 LAB
EST. AVERAGE GLUCOSE BLD GHB EST-MCNC: 159.9 MG/DL
HBA1C MFR BLD: 7.2 %

## 2024-07-17 PROCEDURE — 99214 OFFICE O/P EST MOD 30 MIN: CPT | Performed by: INTERNAL MEDICINE

## 2024-07-17 PROCEDURE — 3075F SYST BP GE 130 - 139MM HG: CPT | Performed by: INTERNAL MEDICINE

## 2024-07-17 PROCEDURE — 3051F HG A1C>EQUAL 7.0%<8.0%: CPT | Performed by: INTERNAL MEDICINE

## 2024-07-17 PROCEDURE — 3079F DIAST BP 80-89 MM HG: CPT | Performed by: INTERNAL MEDICINE

## 2024-07-17 PROCEDURE — 76536 US EXAM OF HEAD AND NECK: CPT

## 2024-07-17 PROCEDURE — 95251 CONT GLUC MNTR ANALYSIS I&R: CPT | Performed by: INTERNAL MEDICINE

## 2024-07-17 NOTE — PROGRESS NOTES
were reviewed with the patient.  Diabetes health maintenance plan and follow-up were discussed and understood by the patient.  We reviewed the importance of medication compliance and regular follow-up.   Aggressive lifestyle modification was encouraged.     Exercise Counselling:  This patient is (not) a candidate for regular physical exercise. Instructions to perform the following types of exercise:  Swimming or water aerobic exercise  Brisk walking  Playing tennis  Stationary bicycle or elliptical indoor  Low impact aerobic exercise    Instructions given to exercise for the following duration:  30 minutes a day for five-seven days per week.    Following instructions for being active throughout the day in addition to formal exercise:  Walk instead of drive whenever possible  Take the stairs instead of the elevator  Work in the garden  Park to the far end of the parking lot to add more walking steps to destination      Electronically signed by WASHINGTON BECERRA MD on 7/17/2024 at 9:02 AM

## 2024-09-16 DIAGNOSIS — E10.21 TYPE 1 DIABETES MELLITUS WITH DIABETIC NEPHROPATHY (HCC): ICD-10-CM

## 2024-09-16 DIAGNOSIS — Z96.41 INSULIN PUMP STATUS: ICD-10-CM

## 2024-09-16 DIAGNOSIS — E10.3299 BACKGROUND DIABETIC RETINOPATHY WITHOUT MACULAR EDEMA ASSOCIATED WITH TYPE 1 DIABETES MELLITUS (HCC): ICD-10-CM

## 2024-09-16 DIAGNOSIS — E55.9 VITAMIN D DEFICIENCY: ICD-10-CM

## 2024-09-16 DIAGNOSIS — Z46.81 INSULIN PUMP TITRATION: ICD-10-CM

## 2024-09-16 DIAGNOSIS — E78.5 DYSLIPIDEMIA DUE TO TYPE 1 DIABETES MELLITUS (HCC): ICD-10-CM

## 2024-09-16 DIAGNOSIS — E10.69 DYSLIPIDEMIA DUE TO TYPE 1 DIABETES MELLITUS (HCC): ICD-10-CM

## 2024-09-16 DIAGNOSIS — E04.2 MULTIPLE THYROID NODULES: ICD-10-CM

## 2024-09-16 DIAGNOSIS — I10 ESSENTIAL HYPERTENSION: ICD-10-CM

## 2024-09-16 RX ORDER — PROCHLORPERAZINE 25 MG/1
SUPPOSITORY RECTAL
Qty: 9 EACH | Refills: 1 | OUTPATIENT
Start: 2024-09-16

## 2024-10-29 DIAGNOSIS — E10.21 TYPE 1 DIABETES MELLITUS WITH DIABETIC NEPHROPATHY (HCC): ICD-10-CM

## 2024-10-29 DIAGNOSIS — Z96.41 INSULIN PUMP STATUS: ICD-10-CM

## 2024-10-29 DIAGNOSIS — E04.2 MULTIPLE THYROID NODULES: ICD-10-CM

## 2024-10-29 DIAGNOSIS — E55.9 VITAMIN D DEFICIENCY: ICD-10-CM

## 2024-10-29 DIAGNOSIS — I10 ESSENTIAL HYPERTENSION: ICD-10-CM

## 2024-10-29 DIAGNOSIS — E78.5 DYSLIPIDEMIA DUE TO TYPE 1 DIABETES MELLITUS (HCC): ICD-10-CM

## 2024-10-29 DIAGNOSIS — E10.3299 BACKGROUND DIABETIC RETINOPATHY WITHOUT MACULAR EDEMA ASSOCIATED WITH TYPE 1 DIABETES MELLITUS (HCC): ICD-10-CM

## 2024-10-29 DIAGNOSIS — E10.69 DYSLIPIDEMIA DUE TO TYPE 1 DIABETES MELLITUS (HCC): ICD-10-CM

## 2024-10-30 ENCOUNTER — TELEMEDICINE (OUTPATIENT)
Dept: ENDOCRINOLOGY | Age: 59
End: 2024-10-30
Payer: COMMERCIAL

## 2024-10-30 DIAGNOSIS — E78.5 DYSLIPIDEMIA DUE TO TYPE 1 DIABETES MELLITUS (HCC): ICD-10-CM

## 2024-10-30 DIAGNOSIS — E10.21 TYPE 1 DIABETES MELLITUS WITH DIABETIC NEPHROPATHY (HCC): ICD-10-CM

## 2024-10-30 DIAGNOSIS — Z96.41 INSULIN PUMP STATUS: ICD-10-CM

## 2024-10-30 DIAGNOSIS — E10.69 DYSLIPIDEMIA DUE TO TYPE 1 DIABETES MELLITUS (HCC): ICD-10-CM

## 2024-10-30 DIAGNOSIS — I10 ESSENTIAL HYPERTENSION: ICD-10-CM

## 2024-10-30 DIAGNOSIS — E55.9 VITAMIN D DEFICIENCY: ICD-10-CM

## 2024-10-30 DIAGNOSIS — E10.3299 BACKGROUND DIABETIC RETINOPATHY WITHOUT MACULAR EDEMA ASSOCIATED WITH TYPE 1 DIABETES MELLITUS (HCC): Primary | ICD-10-CM

## 2024-10-30 DIAGNOSIS — E04.2 MULTIPLE THYROID NODULES: ICD-10-CM

## 2024-10-30 LAB
ALBUMIN SERPL-MCNC: 4 G/DL (ref 3.4–5)
ALBUMIN/GLOB SERPL: 1.8 {RATIO} (ref 1.1–2.2)
ALP SERPL-CCNC: 73 U/L (ref 40–129)
ALT SERPL-CCNC: 16 U/L (ref 10–40)
ANION GAP SERPL CALCULATED.3IONS-SCNC: 9 MMOL/L (ref 3–16)
AST SERPL-CCNC: 18 U/L (ref 15–37)
BILIRUB SERPL-MCNC: 0.3 MG/DL (ref 0–1)
BUN SERPL-MCNC: 14 MG/DL (ref 7–20)
CALCIUM SERPL-MCNC: 8.9 MG/DL (ref 8.3–10.6)
CHLORIDE SERPL-SCNC: 103 MMOL/L (ref 99–110)
CO2 SERPL-SCNC: 28 MMOL/L (ref 21–32)
CREAT SERPL-MCNC: 0.8 MG/DL (ref 0.6–1.1)
CREAT UR-MCNC: 132 MG/DL (ref 28–259)
EST. AVERAGE GLUCOSE BLD GHB EST-MCNC: 157.1 MG/DL
GFR SERPLBLD CREATININE-BSD FMLA CKD-EPI: 86 ML/MIN/{1.73_M2}
GLUCOSE SERPL-MCNC: 166 MG/DL (ref 70–99)
HBA1C MFR BLD: 7.1 %
MICROALBUMIN UR DL<=1MG/L-MCNC: 51.5 MG/DL
MICROALBUMIN/CREAT UR: 390.2 MG/G (ref 0–30)
POTASSIUM SERPL-SCNC: 4.3 MMOL/L (ref 3.5–5.1)
PROT SERPL-MCNC: 6.2 G/DL (ref 6.4–8.2)
SODIUM SERPL-SCNC: 140 MMOL/L (ref 136–145)

## 2024-10-30 PROCEDURE — 3051F HG A1C>EQUAL 7.0%<8.0%: CPT | Performed by: INTERNAL MEDICINE

## 2024-10-30 PROCEDURE — 95251 CONT GLUC MNTR ANALYSIS I&R: CPT | Performed by: INTERNAL MEDICINE

## 2024-10-30 PROCEDURE — 99214 OFFICE O/P EST MOD 30 MIN: CPT | Performed by: INTERNAL MEDICINE

## 2024-10-30 RX ORDER — INSULIN LISPRO 200 [IU]/ML
INJECTION, SOLUTION SUBCUTANEOUS
Qty: 45 ML | Refills: 1 | Status: SHIPPED | OUTPATIENT
Start: 2024-10-30

## 2024-10-30 NOTE — PROGRESS NOTES
Patient ID:   Ileana Sánchez is a 58 y.o. female     Chief Complaint:   Ileana Sánchez presents for an  an evaluation of Type 1 Diabetes Mellitus , Hyperlipidemia and hypertension.      Ileana Sánchez, was evaluated through a synchronous (real-time) audio-video encounter. The patient (or guardian if applicable) is aware that this is a billable service, which includes applicable co-pays. This Virtual Visit was conducted with patient's (and/or legal guardian's) consent. Patient identification was verified, and a caregiver was present when appropriate.   The patient was located at Home: [address] [place], [place] [id number]  Provider was located at Facility (Appt Dept): [address] [facility]  [address] [place] [address]  Confirm you are appropriately licensed, registered, or certified to deliver care in the state where the patient is located as indicated above. If you are not or unsure, please re-schedule the visit: Yes, I confirm.         Subjective:   Type 1 Diabetes Mellitus diagnosed in 8. On insulin since diagnosis.     Previously seen by Dr Miles Johnson. She was not happy with the care .     She lost 7 lbs after 21 lbs . She is doing 40 day turn up. Eats 4 meals, 400 or less shraddha in each meal. No meal after 7 pm.. 1 gallon of water per day. Walking , chel chi or baby shark work out savi     Used Humalog U200 for Omnipod     On asking if she counts carbs. She said she tries some times. She said she eats same stuff      Omnipod 5   Using Humalog U 200       Basal rates (units per hour)   MN:   0.50       Insulin to carb ratio   MN:  11 (may need close to 15-20)   11am:    12 (may need close to 15-20)      Correction   MN:  46      Target glucose range:  MN: 120  6am: 110    Correction above  130   Active insulin time : 4 hours      Max bolus:  20 units      Using DEXCOM     Checks blood sugars 4-5 times per day. Reviewed/Reported  AM:    Lunch:    Supper:     HS:      Hypoglycemias: Sep 2022: 55 before dinner.      Meals: Three,

## 2024-12-06 DIAGNOSIS — Z96.41 INSULIN PUMP STATUS: ICD-10-CM

## 2024-12-06 DIAGNOSIS — E10.3299 BACKGROUND DIABETIC RETINOPATHY WITHOUT MACULAR EDEMA ASSOCIATED WITH TYPE 1 DIABETES MELLITUS (HCC): ICD-10-CM

## 2024-12-06 DIAGNOSIS — E04.2 MULTIPLE THYROID NODULES: ICD-10-CM

## 2024-12-06 DIAGNOSIS — I10 ESSENTIAL HYPERTENSION: ICD-10-CM

## 2024-12-06 DIAGNOSIS — Z46.81 INSULIN PUMP TITRATION: ICD-10-CM

## 2024-12-06 DIAGNOSIS — E55.9 VITAMIN D DEFICIENCY: ICD-10-CM

## 2024-12-06 DIAGNOSIS — E10.69 DYSLIPIDEMIA DUE TO TYPE 1 DIABETES MELLITUS (HCC): ICD-10-CM

## 2024-12-06 DIAGNOSIS — E10.21 TYPE 1 DIABETES MELLITUS WITH DIABETIC NEPHROPATHY (HCC): ICD-10-CM

## 2024-12-06 DIAGNOSIS — E78.5 DYSLIPIDEMIA DUE TO TYPE 1 DIABETES MELLITUS (HCC): ICD-10-CM

## 2024-12-06 RX ORDER — PROCHLORPERAZINE 25 MG/1
SUPPOSITORY RECTAL
Qty: 9 EACH | Refills: 1 | OUTPATIENT
Start: 2024-12-06

## 2024-12-06 NOTE — TELEPHONE ENCOUNTER
Requested Prescriptions     Pending Prescriptions Disp Refills    Continuous Glucose Sensor (DEXCOM G6 SENSOR) MISC [Pharmacy Med Name: DEXCOM G6 SENSOR (3 PACK)] 9 each 1     Sig: CHANGE EVERY 10 DAYS. USE FOR PERSONAL CFMS. ON MULTIPLE INSULIN SHOTS, CHECK BLOOD SUGAR FOUR TIMES DAILY AND REQURES FREQUENT INSULIN ADJUSTMENT     Last refilled: 5/2/2024 # 9 with 1 refill     Lov: VV 10/30/2024  Nov: Needed for refilled

## 2024-12-11 DIAGNOSIS — Z46.81 INSULIN PUMP TITRATION: ICD-10-CM

## 2024-12-11 DIAGNOSIS — Z96.41 INSULIN PUMP STATUS: ICD-10-CM

## 2024-12-11 DIAGNOSIS — E10.69 DYSLIPIDEMIA DUE TO TYPE 1 DIABETES MELLITUS (HCC): ICD-10-CM

## 2024-12-11 DIAGNOSIS — E55.9 VITAMIN D DEFICIENCY: ICD-10-CM

## 2024-12-11 DIAGNOSIS — E10.3299 BACKGROUND DIABETIC RETINOPATHY WITHOUT MACULAR EDEMA ASSOCIATED WITH TYPE 1 DIABETES MELLITUS (HCC): ICD-10-CM

## 2024-12-11 DIAGNOSIS — I10 ESSENTIAL HYPERTENSION: ICD-10-CM

## 2024-12-11 DIAGNOSIS — E10.21 TYPE 1 DIABETES MELLITUS WITH DIABETIC NEPHROPATHY (HCC): ICD-10-CM

## 2024-12-11 DIAGNOSIS — E78.5 DYSLIPIDEMIA DUE TO TYPE 1 DIABETES MELLITUS (HCC): ICD-10-CM

## 2024-12-11 DIAGNOSIS — E04.2 MULTIPLE THYROID NODULES: ICD-10-CM

## 2024-12-12 ENCOUNTER — TELEPHONE (OUTPATIENT)
Dept: ENDOCRINOLOGY | Age: 59
End: 2024-12-12

## 2024-12-12 RX ORDER — PROCHLORPERAZINE 25 MG/1
SUPPOSITORY RECTAL
Qty: 3 EACH | Refills: 0 | Status: SHIPPED | OUTPATIENT
Start: 2024-12-12

## 2024-12-12 NOTE — TELEPHONE ENCOUNTER
----- Message from Dr. Shasha Olsen MD sent at 12/12/2024  8:30 AM EST -----    Please schedule follow up

## 2024-12-29 DIAGNOSIS — E55.9 VITAMIN D DEFICIENCY: ICD-10-CM

## 2024-12-29 DIAGNOSIS — E04.2 MULTIPLE THYROID NODULES: ICD-10-CM

## 2024-12-29 DIAGNOSIS — Z96.41 INSULIN PUMP STATUS: ICD-10-CM

## 2024-12-29 DIAGNOSIS — E10.21 TYPE 1 DIABETES MELLITUS WITH DIABETIC NEPHROPATHY (HCC): ICD-10-CM

## 2024-12-29 DIAGNOSIS — E10.3299 BACKGROUND DIABETIC RETINOPATHY WITHOUT MACULAR EDEMA ASSOCIATED WITH TYPE 1 DIABETES MELLITUS (HCC): ICD-10-CM

## 2024-12-29 DIAGNOSIS — E10.69 DYSLIPIDEMIA DUE TO TYPE 1 DIABETES MELLITUS (HCC): ICD-10-CM

## 2024-12-29 DIAGNOSIS — E78.5 DYSLIPIDEMIA DUE TO TYPE 1 DIABETES MELLITUS (HCC): ICD-10-CM

## 2024-12-29 DIAGNOSIS — Z46.81 INSULIN PUMP TITRATION: ICD-10-CM

## 2024-12-29 DIAGNOSIS — I10 ESSENTIAL HYPERTENSION: ICD-10-CM

## 2024-12-30 RX ORDER — PROCHLORPERAZINE 25 MG/1
SUPPOSITORY RECTAL
Qty: 1 EACH | Refills: 0 | Status: SHIPPED | OUTPATIENT
Start: 2024-12-30

## 2024-12-30 NOTE — TELEPHONE ENCOUNTER
Medication:   Requested Prescriptions     Pending Prescriptions Disp Refills    Continuous Glucose Transmitter (DEXCOM G6 TRANSMITTER) MISC [Pharmacy Med Name: DEXCOM G6 TRANSMITTER] 1 each 0     Sig: CHANGE EVERY 3 MONTHS       Last Filled:  5/2/24    Patient Phone Number: [phone] (home)     Last appt: 10/30/2024   Next appt: 1/29/2025    Last Labs DM:   Lab Results   Component Value Date/Time    LABA1C 7.1 10/29/2024 07:44 AM

## 2025-01-13 DIAGNOSIS — E04.2 MULTIPLE THYROID NODULES: ICD-10-CM

## 2025-01-13 DIAGNOSIS — I10 ESSENTIAL HYPERTENSION: ICD-10-CM

## 2025-01-13 DIAGNOSIS — E78.5 DYSLIPIDEMIA DUE TO TYPE 1 DIABETES MELLITUS (HCC): ICD-10-CM

## 2025-01-13 DIAGNOSIS — E10.21 TYPE 1 DIABETES MELLITUS WITH DIABETIC NEPHROPATHY (HCC): ICD-10-CM

## 2025-01-13 DIAGNOSIS — Z96.41 INSULIN PUMP STATUS: ICD-10-CM

## 2025-01-13 DIAGNOSIS — E55.9 VITAMIN D DEFICIENCY: ICD-10-CM

## 2025-01-13 DIAGNOSIS — E10.69 DYSLIPIDEMIA DUE TO TYPE 1 DIABETES MELLITUS (HCC): ICD-10-CM

## 2025-01-13 DIAGNOSIS — Z46.81 INSULIN PUMP TITRATION: ICD-10-CM

## 2025-01-13 DIAGNOSIS — E10.3299 BACKGROUND DIABETIC RETINOPATHY WITHOUT MACULAR EDEMA ASSOCIATED WITH TYPE 1 DIABETES MELLITUS (HCC): ICD-10-CM

## 2025-01-13 RX ORDER — PROCHLORPERAZINE 25 MG/1
SUPPOSITORY RECTAL
Qty: 3 EACH | Refills: 5 | Status: SHIPPED | OUTPATIENT
Start: 2025-01-13

## 2025-01-13 NOTE — TELEPHONE ENCOUNTER
Medication:   Requested Prescriptions     Pending Prescriptions Disp Refills    Continuous Glucose Sensor (DEXCOM G6 SENSOR) MISC [Pharmacy Med Name: DEXCOM G6 SENSOR (3 PACK)] 3 each 0     Sig: CHANGE EVERY 10 DAYS       Last Filled:      Patient Phone Number: [phone] (home)     Last appt: 10/30/2024   Next appt: 1/29/2025    Last Labs DM:   Lab Results   Component Value Date/Time    LABA1C 7.1 10/29/2024 07:44 AM

## 2025-01-23 DIAGNOSIS — I10 ESSENTIAL HYPERTENSION: ICD-10-CM

## 2025-01-23 DIAGNOSIS — E10.3299 BACKGROUND DIABETIC RETINOPATHY WITHOUT MACULAR EDEMA ASSOCIATED WITH TYPE 1 DIABETES MELLITUS (HCC): ICD-10-CM

## 2025-01-23 DIAGNOSIS — E04.2 MULTIPLE THYROID NODULES: ICD-10-CM

## 2025-01-23 DIAGNOSIS — E78.5 DYSLIPIDEMIA DUE TO TYPE 1 DIABETES MELLITUS (HCC): ICD-10-CM

## 2025-01-23 DIAGNOSIS — E10.21 TYPE 1 DIABETES MELLITUS WITH DIABETIC NEPHROPATHY (HCC): ICD-10-CM

## 2025-01-23 DIAGNOSIS — Z96.41 INSULIN PUMP STATUS: ICD-10-CM

## 2025-01-23 DIAGNOSIS — E55.9 VITAMIN D DEFICIENCY: ICD-10-CM

## 2025-01-23 DIAGNOSIS — E10.69 DYSLIPIDEMIA DUE TO TYPE 1 DIABETES MELLITUS (HCC): ICD-10-CM

## 2025-01-23 LAB
25(OH)D3 SERPL-MCNC: 53.6 NG/ML
CHOLEST SERPL-MCNC: 200 MG/DL (ref 0–199)
HDLC SERPL-MCNC: 61 MG/DL (ref 40–60)
LDL CHOLESTEROL: 122 MG/DL
TRIGL SERPL-MCNC: 84 MG/DL (ref 0–150)
VLDLC SERPL CALC-MCNC: 17 MG/DL

## 2025-01-24 LAB
EST. AVERAGE GLUCOSE BLD GHB EST-MCNC: 157.1 MG/DL
HBA1C MFR BLD: 7.1 %

## 2025-01-29 ENCOUNTER — OFFICE VISIT (OUTPATIENT)
Dept: ENDOCRINOLOGY | Age: 60
End: 2025-01-29
Payer: COMMERCIAL

## 2025-01-29 VITALS
BODY MASS INDEX: 36.62 KG/M2 | HEART RATE: 83 BPM | HEIGHT: 62 IN | DIASTOLIC BLOOD PRESSURE: 82 MMHG | WEIGHT: 199 LBS | SYSTOLIC BLOOD PRESSURE: 152 MMHG | OXYGEN SATURATION: 97 %

## 2025-01-29 DIAGNOSIS — E10.3299 BACKGROUND DIABETIC RETINOPATHY WITHOUT MACULAR EDEMA ASSOCIATED WITH TYPE 1 DIABETES MELLITUS (HCC): ICD-10-CM

## 2025-01-29 DIAGNOSIS — I10 ESSENTIAL HYPERTENSION: ICD-10-CM

## 2025-01-29 DIAGNOSIS — E10.69 DYSLIPIDEMIA DUE TO TYPE 1 DIABETES MELLITUS (HCC): ICD-10-CM

## 2025-01-29 DIAGNOSIS — Z46.81 INSULIN PUMP TITRATION: ICD-10-CM

## 2025-01-29 DIAGNOSIS — E78.5 DYSLIPIDEMIA DUE TO TYPE 1 DIABETES MELLITUS (HCC): ICD-10-CM

## 2025-01-29 DIAGNOSIS — Z96.41 INSULIN PUMP STATUS: ICD-10-CM

## 2025-01-29 DIAGNOSIS — E55.9 VITAMIN D DEFICIENCY: ICD-10-CM

## 2025-01-29 DIAGNOSIS — E10.21 TYPE 1 DIABETES MELLITUS WITH DIABETIC NEPHROPATHY (HCC): Primary | ICD-10-CM

## 2025-01-29 DIAGNOSIS — E04.2 MULTIPLE THYROID NODULES: ICD-10-CM

## 2025-01-29 PROCEDURE — 3051F HG A1C>EQUAL 7.0%<8.0%: CPT | Performed by: INTERNAL MEDICINE

## 2025-01-29 PROCEDURE — 99214 OFFICE O/P EST MOD 30 MIN: CPT | Performed by: INTERNAL MEDICINE

## 2025-01-29 PROCEDURE — 3078F DIAST BP <80 MM HG: CPT | Performed by: INTERNAL MEDICINE

## 2025-01-29 PROCEDURE — 95251 CONT GLUC MNTR ANALYSIS I&R: CPT | Performed by: INTERNAL MEDICINE

## 2025-01-29 PROCEDURE — 3077F SYST BP >= 140 MM HG: CPT | Performed by: INTERNAL MEDICINE

## 2025-01-29 RX ORDER — PROCHLORPERAZINE 25 MG/1
SUPPOSITORY RECTAL
Qty: 1 EACH | Refills: 3 | Status: SHIPPED | OUTPATIENT
Start: 2025-01-29

## 2025-01-29 RX ORDER — PROCHLORPERAZINE 25 MG/1
SUPPOSITORY RECTAL
Qty: 3 EACH | Refills: 11 | Status: SHIPPED | OUTPATIENT
Start: 2025-01-29

## 2025-01-29 NOTE — PROGRESS NOTES
Patient ID:   Ileana Sáncehz is a 59 y.o. female     Chief Complaint:   Ileana Sánchez presents for an  an evaluation of Type 1 Diabetes Mellitus , Hyperlipidemia and hypertension.      Subjective:   Type 1 Diabetes Mellitus diagnosed in 8. On insulin since diagnosis.     Previously seen by Dr Miles Johnson. She was not happy with the care .     She lost 7 lbs after 21 lbs . She is doing 40 day turn up. Eats 4 meals, 400 or less shraddha in each meal. No meal after 7 pm.. 1 gallon of water per day. Walking , chel chi or baby shark work out savi     Used Humalog U200 for Omnipod     On asking if she counts carbs. She said she tries some times. She said she eats same stuff      Omnipod 5   Using Humalog U 200       Basal rates (units per hour)   MN:   0.50       Insulin to carb ratio   MN:  11 (may need close to 15-20)   11am:    12 (may need close to 15-20)      Correction   MN:  46      Target glucose range:  MN: 120  6am: 110    Correction above  130   Active insulin time : 4 hours      Max bolus:  20 units      Using DEXCOM     Checks blood sugars 4-5 times per day. Reviewed/Reported  AM:    Lunch:    Supper:     HS:      Hypoglycemias: Sep 2022: 55 before dinner.      Meals: Three, dinner is bigger. Usually no snacks. No sodas.   Exercise: Started dance/stretch and strengthening class yesterday. It is twice a week.   She will also start doing Chel-chi    Denies chest pain, exertional dyspnea.     Family history of CAD: Dad had MI in his 60's   Denies smoking. Counselled for smoking cessation.   Denies/Drinks alcohol.   Currently on ASA mg daily     Thyroid nodules:   US May 2020   One on right and and one on left   One subcentimeter thyroid nodule in the right thyroid lobe which is smoothly marginated, not taller-than-wide, and with no echogenic foci, deemed to be clinically insignificant, and for which no biopsy or follow up is recommended per ACR TIRADS criteria.     Other potentially more significant thyroid nodule is are

## 2025-02-04 ENCOUNTER — PATIENT MESSAGE (OUTPATIENT)
Dept: ENDOCRINOLOGY | Age: 60
End: 2025-02-04

## 2025-02-06 DIAGNOSIS — E10.21 TYPE 1 DIABETES MELLITUS WITH DIABETIC NEPHROPATHY (HCC): ICD-10-CM

## 2025-02-06 RX ORDER — INSULIN LISPRO 200 [IU]/ML
INJECTION, SOLUTION SUBCUTANEOUS
Qty: 45 ML | Refills: 1 | Status: SHIPPED | OUTPATIENT
Start: 2025-02-06

## 2025-02-06 NOTE — TELEPHONE ENCOUNTER
Requested Prescriptions     Pending Prescriptions Disp Refills    insulin lispro (HUMALOG KWIKPEN) 200 UNIT/ML SOPN pen [Pharmacy Med Name: HUMALOG 200 U/ML KWIKPEN INJ 3ML] 45 mL 1     Sig: USE IN INSULIN PUMP  UNITS A DAY     Last refilled: 10/30/2024 # 45 ml with 1 refill    Lov:1/29/2025  Nov: 4/29/2025

## 2025-03-05 LAB — DIABETIC RETINOPATHY: NEGATIVE

## 2025-03-06 ENCOUNTER — TELEPHONE (OUTPATIENT)
Dept: ENDOCRINOLOGY | Age: 60
End: 2025-03-06

## 2025-04-15 RX ORDER — LOSARTAN POTASSIUM AND HYDROCHLOROTHIAZIDE 12.5; 5 MG/1; MG/1
1 TABLET ORAL DAILY
Qty: 90 TABLET | OUTPATIENT
Start: 2025-04-15

## 2025-04-15 NOTE — TELEPHONE ENCOUNTER
Requested Prescriptions     Pending Prescriptions Disp Refills    losartan-hydroCHLOROthiazide (HYZAAR) 50-12.5 MG per tablet [Pharmacy Med Name: LOSARTAN/HCTZ 50/12.5MG TABLETS] 90 tablet      Sig: TAKE 1 TABLET BY MOUTH EVERY DAY     Lov: 1/29/2025 per last office note :HCTZ caused urine incontinence   Slightly high    Nov: 4/29/2025

## 2025-04-28 DIAGNOSIS — E10.69 DYSLIPIDEMIA DUE TO TYPE 1 DIABETES MELLITUS (HCC): ICD-10-CM

## 2025-04-28 DIAGNOSIS — E10.3299 BACKGROUND DIABETIC RETINOPATHY WITHOUT MACULAR EDEMA ASSOCIATED WITH TYPE 1 DIABETES MELLITUS (HCC): ICD-10-CM

## 2025-04-28 DIAGNOSIS — E10.21 TYPE 1 DIABETES MELLITUS WITH DIABETIC NEPHROPATHY (HCC): ICD-10-CM

## 2025-04-28 DIAGNOSIS — Z96.41 INSULIN PUMP STATUS: ICD-10-CM

## 2025-04-28 DIAGNOSIS — E04.2 MULTIPLE THYROID NODULES: ICD-10-CM

## 2025-04-28 DIAGNOSIS — Z46.81 INSULIN PUMP TITRATION: ICD-10-CM

## 2025-04-28 DIAGNOSIS — I10 ESSENTIAL HYPERTENSION: ICD-10-CM

## 2025-04-28 DIAGNOSIS — E55.9 VITAMIN D DEFICIENCY: ICD-10-CM

## 2025-04-28 DIAGNOSIS — E78.5 DYSLIPIDEMIA DUE TO TYPE 1 DIABETES MELLITUS (HCC): ICD-10-CM

## 2025-04-29 ENCOUNTER — OFFICE VISIT (OUTPATIENT)
Dept: ENDOCRINOLOGY | Age: 60
End: 2025-04-29
Payer: COMMERCIAL

## 2025-04-29 VITALS
HEIGHT: 62 IN | WEIGHT: 211.8 LBS | HEART RATE: 91 BPM | DIASTOLIC BLOOD PRESSURE: 72 MMHG | BODY MASS INDEX: 38.98 KG/M2 | OXYGEN SATURATION: 96 % | SYSTOLIC BLOOD PRESSURE: 118 MMHG

## 2025-04-29 DIAGNOSIS — Z96.41 INSULIN PUMP STATUS: ICD-10-CM

## 2025-04-29 DIAGNOSIS — E10.3299 BACKGROUND DIABETIC RETINOPATHY WITHOUT MACULAR EDEMA ASSOCIATED WITH TYPE 1 DIABETES MELLITUS (HCC): ICD-10-CM

## 2025-04-29 DIAGNOSIS — E55.9 VITAMIN D DEFICIENCY: Primary | ICD-10-CM

## 2025-04-29 DIAGNOSIS — I10 ESSENTIAL HYPERTENSION: ICD-10-CM

## 2025-04-29 DIAGNOSIS — E78.5 DYSLIPIDEMIA DUE TO TYPE 1 DIABETES MELLITUS (HCC): ICD-10-CM

## 2025-04-29 DIAGNOSIS — E10.69 DYSLIPIDEMIA DUE TO TYPE 1 DIABETES MELLITUS (HCC): ICD-10-CM

## 2025-04-29 DIAGNOSIS — E10.21 TYPE 1 DIABETES MELLITUS WITH DIABETIC NEPHROPATHY (HCC): ICD-10-CM

## 2025-04-29 PROCEDURE — 3051F HG A1C>EQUAL 7.0%<8.0%: CPT | Performed by: INTERNAL MEDICINE

## 2025-04-29 PROCEDURE — 3074F SYST BP LT 130 MM HG: CPT | Performed by: INTERNAL MEDICINE

## 2025-04-29 PROCEDURE — 95251 CONT GLUC MNTR ANALYSIS I&R: CPT | Performed by: INTERNAL MEDICINE

## 2025-04-29 PROCEDURE — 99214 OFFICE O/P EST MOD 30 MIN: CPT | Performed by: INTERNAL MEDICINE

## 2025-04-29 PROCEDURE — 3078F DIAST BP <80 MM HG: CPT | Performed by: INTERNAL MEDICINE

## 2025-04-29 RX ORDER — LOSARTAN POTASSIUM 25 MG/1
0.5 TABLET ORAL DAILY
COMMUNITY

## 2025-04-29 NOTE — PROGRESS NOTES
Albumin/Creatinine Ratio, Urine; Future  -      DIABETES FOOT EXAM          1: Type 1 DM complicated with nephropathy, retinopathy , neuropathy    ontrolled A1C 7.1% < 7.1% < 7.2% < 7.1% < 7.5% < 8.3% < 8.6% < 8.6% < 7.7% < 8.7% <  8.6%     A1C of <8 would be acceptable because of microvascular complications.     We could not download as Anuradha was not working. She will download at home and send me.   She has 20% failures on PODS.    DEXCOM personal CGMS data downloaded and reviewed   Average glucose: 165 < 161 < 159 < 162 < 163 < 168 < 164 < 164 < 165 < 183  Blood sugars: Above 180 - 32 % ,  - 68%, below 70 - 0% < 3% < 1% < 0% < 4%  Automated mode: 62%  Daily insulin use: 30 < 37.5 < 40 <35 < 30 < 90 units   Basal 53%, bolus 47%    Carbs: 151 < 83 < 44 < 48.8   Auto mode : 60% < 28%   Blood sugars are better   Some numbers are higher after dinner, but not always    Doing better taking boluses, still missed   No activity related changes in blood sugars   Hypoglycemia: multiple episodes due to correction    Based on the data, changes are below      Missing boluses as boluses causing lows   Her exercsie and weigh tloss plan has reduced the amount of insulin   For exercised induced low sugars, created preset of 40% less insulin for one hour     C/w Dexcom     We need to take carb bolus at least 10-15 minutes before eating     To lose weight, target Hernan intake less than 1050 a day     Humalog U-200      Basal rates (units per hour)   MN:   0.50       Insulin to carb ratio   MN:    12 (may need close to 15-20)   11am:    12 (may need close to 15-20)      Correction   MN:   50      Target glucose range:  MN: 120  6am: 110    Correction above  130   Active insulin time : 4 hours     Max bolus:  20 units      Using DEXCOM   She does not want to take Victoza for weight loss     All instructions provided in written.   Check Blood sugars 4 times per day. Log them along with insulin and send them every 2 weeks. Call for

## 2025-04-30 ENCOUNTER — RESULTS FOLLOW-UP (OUTPATIENT)
Dept: ENDOCRINOLOGY | Age: 60
End: 2025-04-30

## 2025-04-30 LAB
EST. AVERAGE GLUCOSE BLD GHB EST-MCNC: 180 MG/DL
HBA1C MFR BLD: 7.9 %

## 2025-05-17 DIAGNOSIS — E10.21 TYPE 1 DIABETES MELLITUS WITH DIABETIC NEPHROPATHY (HCC): ICD-10-CM

## 2025-05-19 RX ORDER — INSULIN PMP CART,AUT,G6/7,CNTR
EACH SUBCUTANEOUS
Qty: 30 EACH | Refills: 3 | Status: SHIPPED | OUTPATIENT
Start: 2025-05-19

## 2025-05-19 NOTE — TELEPHONE ENCOUNTER
Requested Prescriptions     Pending Prescriptions Disp Refills    Insulin Disposable Pump (OMNIPOD 5 [id number] PODS GEN 5) MISC [Pharmacy Med Name: OMNIPOD 5 DEXCOM G7/G6 PODS (GEN 5)] 30 each 3     Sig: CHANGE EVERY 3 DAYS     Last refilled : 5/2/2024 # 30 with 3 refills    LOV: 4/29/2025  NOV: 8/22/2025

## 2025-06-05 DIAGNOSIS — E55.9 VITAMIN D DEFICIENCY: ICD-10-CM

## 2025-06-05 RX ORDER — ERGOCALCIFEROL 1.25 MG/1
CAPSULE, LIQUID FILLED ORAL
Qty: 26 CAPSULE | Refills: 0 | Status: SHIPPED | OUTPATIENT
Start: 2025-06-05

## 2025-06-05 NOTE — TELEPHONE ENCOUNTER
Medication:   Requested Prescriptions     Pending Prescriptions Disp Refills    vitamin D (ERGOCALCIFEROL) 1.25 MG (21363 UT) CAPS capsule 26 capsule 0     Sig: TAKE 1 CAPSULE BY MOUTH 2 TIMES A WEEK       Last Filled:  2/8/24    Patient Phone Number: [phone] (home)     Last appt: 4/29/2025   Next appt: 8/22/2025    Last Labs DM:   Lab Results   Component Value Date/Time    VITD25 53.6 01/23/2025 07:38 AM    VITD25 59.9 01/09/2024 08:26 AM

## 2025-08-16 DIAGNOSIS — E10.21 TYPE 1 DIABETES MELLITUS WITH DIABETIC NEPHROPATHY (HCC): ICD-10-CM

## 2025-08-18 DIAGNOSIS — E10.69 DYSLIPIDEMIA DUE TO TYPE 1 DIABETES MELLITUS (HCC): ICD-10-CM

## 2025-08-18 DIAGNOSIS — E55.9 VITAMIN D DEFICIENCY: ICD-10-CM

## 2025-08-18 DIAGNOSIS — I10 ESSENTIAL HYPERTENSION: ICD-10-CM

## 2025-08-18 DIAGNOSIS — E10.3299 BACKGROUND DIABETIC RETINOPATHY WITHOUT MACULAR EDEMA ASSOCIATED WITH TYPE 1 DIABETES MELLITUS (HCC): ICD-10-CM

## 2025-08-18 DIAGNOSIS — E78.5 DYSLIPIDEMIA DUE TO TYPE 1 DIABETES MELLITUS (HCC): ICD-10-CM

## 2025-08-18 DIAGNOSIS — E10.21 TYPE 1 DIABETES MELLITUS WITH DIABETIC NEPHROPATHY (HCC): ICD-10-CM

## 2025-08-18 DIAGNOSIS — Z96.41 INSULIN PUMP STATUS: ICD-10-CM

## 2025-08-18 LAB
CREAT UR-MCNC: 141 MG/DL (ref 28–259)
MICROALBUMIN UR DL<=1MG/L-MCNC: 54.2 MG/DL
MICROALBUMIN/CREAT UR: 384.4 MG/G (ref 0–30)

## 2025-08-18 RX ORDER — INSULIN LISPRO 200 [IU]/ML
INJECTION, SOLUTION SUBCUTANEOUS
Qty: 45 ML | Refills: 1 | Status: SHIPPED | OUTPATIENT
Start: 2025-08-18

## 2025-08-19 LAB
EST. AVERAGE GLUCOSE BLD GHB EST-MCNC: 165.7 MG/DL
HBA1C MFR BLD: 7.4 %

## 2025-08-30 DIAGNOSIS — E55.9 VITAMIN D DEFICIENCY: ICD-10-CM

## 2025-09-02 DIAGNOSIS — E55.9 VITAMIN D DEFICIENCY: ICD-10-CM

## 2025-09-02 RX ORDER — ERGOCALCIFEROL 1.25 MG/1
CAPSULE, LIQUID FILLED ORAL
Qty: 24 CAPSULE | OUTPATIENT
Start: 2025-09-02

## 2025-09-02 RX ORDER — ERGOCALCIFEROL 1.25 MG/1
CAPSULE, LIQUID FILLED ORAL
Qty: 26 CAPSULE | Refills: 0 | Status: SHIPPED | OUTPATIENT
Start: 2025-09-02